# Patient Record
Sex: MALE | Race: WHITE | Employment: FULL TIME | URBAN - NONMETROPOLITAN AREA
[De-identification: names, ages, dates, MRNs, and addresses within clinical notes are randomized per-mention and may not be internally consistent; named-entity substitution may affect disease eponyms.]

---

## 2017-01-09 DIAGNOSIS — E11.8 CONTROLLED TYPE 2 DIABETES MELLITUS WITH COMPLICATION, WITHOUT LONG-TERM CURRENT USE OF INSULIN (HCC): ICD-10-CM

## 2017-01-09 LAB
ALBUMIN SERPL-MCNC: 4.4 G/DL (ref 3.5–5.2)
ALP BLD-CCNC: 52 U/L (ref 40–130)
ALT SERPL-CCNC: 66 U/L (ref 5–41)
ANION GAP SERPL CALCULATED.3IONS-SCNC: 13 MMOL/L (ref 7–19)
AST SERPL-CCNC: 32 U/L (ref 5–40)
BILIRUB SERPL-MCNC: 0.9 MG/DL (ref 0.2–1.2)
BUN BLDV-MCNC: 12 MG/DL (ref 6–20)
CALCIUM SERPL-MCNC: 9.3 MG/DL (ref 8.6–10)
CHLORIDE BLD-SCNC: 101 MMOL/L (ref 98–111)
CHOLESTEROL, TOTAL: 136 MG/DL (ref 160–199)
CO2: 27 MMOL/L (ref 22–29)
CREAT SERPL-MCNC: 0.6 MG/DL (ref 0.5–1.2)
CREATININE URINE: 321.8 MG/DL (ref 4.2–622)
GFR NON-AFRICAN AMERICAN: >60
GLOBULIN: 2.9 G/DL
GLUCOSE BLD-MCNC: 156 MG/DL (ref 74–109)
HBA1C MFR BLD: 6.6 %
HDLC SERPL-MCNC: 33 MG/DL (ref 55–121)
LDL CHOLESTEROL CALCULATED: 86 MG/DL
MICROALBUMIN UR-MCNC: 3.8 MG/DL (ref 0–19)
MICROALBUMIN/CREAT UR-RTO: 11.8 MG/G
POTASSIUM SERPL-SCNC: 4.7 MMOL/L (ref 3.5–5)
SODIUM BLD-SCNC: 141 MMOL/L (ref 136–145)
TOTAL PROTEIN: 7.3 G/DL (ref 6.6–8.7)
TRIGL SERPL-MCNC: 85 MG/DL (ref 150–199)

## 2017-01-19 ENCOUNTER — TELEPHONE (OUTPATIENT)
Dept: PRIMARY CARE CLINIC | Age: 42
End: 2017-01-19

## 2017-09-23 ENCOUNTER — APPOINTMENT (OUTPATIENT)
Dept: GENERAL RADIOLOGY | Facility: HOSPITAL | Age: 42
End: 2017-09-23

## 2017-09-23 ENCOUNTER — HOSPITAL ENCOUNTER (EMERGENCY)
Facility: HOSPITAL | Age: 42
Discharge: HOME | End: 2017-09-23
Attending: EMERGENCY MEDICINE | Admitting: EMERGENCY MEDICINE

## 2017-09-23 VITALS
WEIGHT: 280 LBS | HEIGHT: 70 IN | BODY MASS INDEX: 40.09 KG/M2 | DIASTOLIC BLOOD PRESSURE: 89 MMHG | SYSTOLIC BLOOD PRESSURE: 139 MMHG | OXYGEN SATURATION: 99 % | HEART RATE: 75 BPM | TEMPERATURE: 97.8 F | RESPIRATION RATE: 16 BRPM

## 2017-09-23 DIAGNOSIS — R10.9 ABDOMINAL PAIN, UNSPECIFIED LOCATION: Primary | ICD-10-CM

## 2017-09-23 LAB
ALBUMIN SERPL-MCNC: 4.7 G/DL (ref 3.5–5)
ALBUMIN/GLOB SERPL: 1.6 G/DL (ref 1.1–2.5)
ALP SERPL-CCNC: 51 U/L (ref 24–120)
ALT SERPL W P-5'-P-CCNC: 96 U/L (ref 0–54)
AMYLASE SERPL-CCNC: 54 U/L (ref 30–110)
ANION GAP SERPL CALCULATED.3IONS-SCNC: 17 MMOL/L (ref 4–13)
AST SERPL-CCNC: 46 U/L (ref 7–45)
BASOPHILS # BLD AUTO: 0.04 10*3/MM3 (ref 0–0.2)
BASOPHILS NFR BLD AUTO: 0.5 % (ref 0–2)
BILIRUB SERPL-MCNC: 1.2 MG/DL (ref 0.1–1)
BILIRUB UR QL STRIP: NEGATIVE
BUN BLD-MCNC: 12 MG/DL (ref 5–21)
BUN/CREAT SERPL: 18.2 (ref 7–25)
CALCIUM SPEC-SCNC: 9.8 MG/DL (ref 8.4–10.4)
CHLORIDE SERPL-SCNC: 104 MMOL/L (ref 98–110)
CLARITY UR: CLEAR
CO2 SERPL-SCNC: 23 MMOL/L (ref 24–31)
COLOR UR: YELLOW
CREAT BLD-MCNC: 0.66 MG/DL (ref 0.5–1.4)
DEPRECATED RDW RBC AUTO: 41.9 FL (ref 40–54)
EOSINOPHIL # BLD AUTO: 0.19 10*3/MM3 (ref 0–0.7)
EOSINOPHIL NFR BLD AUTO: 2.4 % (ref 0–4)
ERYTHROCYTE [DISTWIDTH] IN BLOOD BY AUTOMATED COUNT: 12.9 % (ref 12–15)
GFR SERPL CREATININE-BSD FRML MDRD: 132 ML/MIN/1.73
GFR SERPL CREATININE-BSD FRML MDRD: >150 ML/MIN/1.73
GLOBULIN UR ELPH-MCNC: 2.9 GM/DL
GLUCOSE BLD-MCNC: 172 MG/DL (ref 70–100)
GLUCOSE UR STRIP-MCNC: NEGATIVE MG/DL
HCT VFR BLD AUTO: 42.4 % (ref 40–52)
HGB BLD-MCNC: 14.4 G/DL (ref 14–18)
HGB UR QL STRIP.AUTO: NEGATIVE
HOLD SPECIMEN: NORMAL
HOLD SPECIMEN: NORMAL
IMM GRANULOCYTES # BLD: 0.01 10*3/MM3 (ref 0–0.03)
IMM GRANULOCYTES NFR BLD: 0.1 % (ref 0–5)
KETONES UR QL STRIP: NEGATIVE
LEUKOCYTE ESTERASE UR QL STRIP.AUTO: NEGATIVE
LIPASE SERPL-CCNC: 112 U/L (ref 23–203)
LYMPHOCYTES # BLD AUTO: 1.66 10*3/MM3 (ref 0.72–4.86)
LYMPHOCYTES NFR BLD AUTO: 20.6 % (ref 15–45)
MCH RBC QN AUTO: 30.7 PG (ref 28–32)
MCHC RBC AUTO-ENTMCNC: 34 G/DL (ref 33–36)
MCV RBC AUTO: 90.4 FL (ref 82–95)
MONOCYTES # BLD AUTO: 0.74 10*3/MM3 (ref 0.19–1.3)
MONOCYTES NFR BLD AUTO: 9.2 % (ref 4–12)
NEUTROPHILS # BLD AUTO: 5.42 10*3/MM3 (ref 1.87–8.4)
NEUTROPHILS NFR BLD AUTO: 67.2 % (ref 39–78)
NITRITE UR QL STRIP: NEGATIVE
PH UR STRIP.AUTO: 6 [PH] (ref 5–8)
PLATELET # BLD AUTO: 207 10*3/MM3 (ref 130–400)
PMV BLD AUTO: 12.6 FL (ref 6–12)
POTASSIUM BLD-SCNC: 4.1 MMOL/L (ref 3.5–5.3)
PROT SERPL-MCNC: 7.6 G/DL (ref 6.3–8.7)
PROT UR QL STRIP: NEGATIVE
RBC # BLD AUTO: 4.69 10*6/MM3 (ref 4.8–5.9)
SODIUM BLD-SCNC: 144 MMOL/L (ref 135–145)
SP GR UR STRIP: 1.03 (ref 1–1.03)
UROBILINOGEN UR QL STRIP: NORMAL
WBC NRBC COR # BLD: 8.06 10*3/MM3 (ref 4.8–10.8)
WHOLE BLOOD HOLD SPECIMEN: NORMAL
WHOLE BLOOD HOLD SPECIMEN: NORMAL

## 2017-09-23 PROCEDURE — 74000 HC ABDOMEN KUB: CPT

## 2017-09-23 PROCEDURE — 85025 COMPLETE CBC W/AUTO DIFF WBC: CPT | Performed by: EMERGENCY MEDICINE

## 2017-09-23 PROCEDURE — 99283 EMERGENCY DEPT VISIT LOW MDM: CPT

## 2017-09-23 PROCEDURE — 82150 ASSAY OF AMYLASE: CPT | Performed by: EMERGENCY MEDICINE

## 2017-09-23 PROCEDURE — 81003 URINALYSIS AUTO W/O SCOPE: CPT | Performed by: EMERGENCY MEDICINE

## 2017-09-23 PROCEDURE — 83690 ASSAY OF LIPASE: CPT | Performed by: EMERGENCY MEDICINE

## 2017-09-23 PROCEDURE — 80053 COMPREHEN METABOLIC PANEL: CPT | Performed by: EMERGENCY MEDICINE

## 2017-09-23 RX ORDER — LISINOPRIL 40 MG/1
20 TABLET ORAL DAILY
COMMUNITY

## 2017-09-23 RX ORDER — CARVEDILOL 6.25 MG/1
6.25 TABLET ORAL 2 TIMES DAILY WITH MEALS
COMMUNITY
End: 2020-09-20

## 2017-09-23 RX ORDER — ARIPIPRAZOLE 15 MG/1
15 TABLET ORAL DAILY
COMMUNITY

## 2017-09-23 RX ORDER — ATORVASTATIN CALCIUM 10 MG/1
10 TABLET, FILM COATED ORAL DAILY
COMMUNITY

## 2017-09-23 RX ORDER — SODIUM CHLORIDE 0.9 % (FLUSH) 0.9 %
10 SYRINGE (ML) INJECTION AS NEEDED
Status: DISCONTINUED | OUTPATIENT
Start: 2017-09-23 | End: 2017-09-23 | Stop reason: HOSPADM

## 2017-09-23 NOTE — ED PROVIDER NOTES
"Subjective   HPI Comments: 42-year-old gentleman presents to our facility via private vehicle alone with a complaint of abdominal pain.  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    Intensity of pain is 10 out of 10 sharp in nature short lived no specific precipitating factor he feels associated with movement.  Relieved with nothing specific.  He has had similar episodes in the past he has had a abdominal surgery gastric banding in that area and the pain just above the surgical scar.  There is no nausea no vomiting no chest pain no shortness of breath no fever no burning in the urine no back pain.    Patient is a 42 y.o. male presenting with abdominal pain.   History provided by:  Patient   used: No    Abdominal Pain   Pain location:  LUQ  Pain quality: sharp    Pain radiates to:  Does not radiate  Pain severity:  Severe  Onset quality:  Sudden  Timing:  Intermittent  Chronicity:  Recurrent  Context: not alcohol use, not diet changes, not eating and not recent illness    Relieved by:  Nothing  Worsened by:  Nothing  Ineffective treatments:  Movement, not moving and position changes      Review of Systems   Gastrointestinal: Positive for abdominal pain.   All other systems reviewed and are negative.      Past Medical History:   Diagnosis Date   • Bipolar 1 disorder    • Diabetes mellitus    • Hypertension        Allergies   Allergen Reactions   • Percocet [Oxycodone-Acetaminophen] Other (See Comments)     Feels like \"bugs are crawling on me\"       Past Surgical History:   Procedure Laterality Date   • " COLONOSCOPY     • LAPAROSCOPIC GASTRIC BANDING         History reviewed. No pertinent family history.    Social History     Social History   • Marital status:      Spouse name: N/A   • Number of children: N/A   • Years of education: N/A     Social History Main Topics   • Smoking status: Current Every Day Smoker     Types: Electronic Cigarette   • Smokeless tobacco: None      Comment: former smoker, quit 10 yrs ago   • Alcohol use No   • Drug use: No   • Sexual activity: Not Asked     Other Topics Concern   • None     Social History Narrative   • None           Objective   Physical Exam   Constitutional: He is oriented to person, place, and time. He appears well-developed and well-nourished.   HENT:   Head: Normocephalic.   Eyes: EOM are normal. Pupils are equal, round, and reactive to light.   Neck: Normal range of motion. Neck supple.   Cardiovascular: Normal rate and regular rhythm.    Pulmonary/Chest: Effort normal and breath sounds normal.   Abdominal:   Obese abdomen surgical scar noted over the left upper quadrant which is healed appropriately.  Over this area there is moderate amount tenderness on deep palpation.  There is no hepatosplenomegaly.  There is no abnormal palpable mass.  There is no abnormal palpable pulse.   Neurological: He is alert and oriented to person, place, and time. He has normal reflexes.   Skin: Skin is warm.   Psychiatric: His behavior is normal. Judgment and thought content normal.   Nursing note and vitals reviewed.      Procedures         ED Course  ED Course   Comment By Time   Repeat examination 9:45 AM he is comfortable on his iPhone appears to be no acute distress his laboratory studies are within normal limits.  At this time I am going to be discharging him home. Dylan BUENO MD 09/23 [id number]                  [facility]  Number of Diagnoses or Management Options  Abdominal pain, unspecified location: new and requires workup     Amount and/or Complexity of Data  Reviewed  Clinical lab tests: reviewed and ordered  Tests in the radiology section of CPT®: ordered and reviewed    Risk of Complications, Morbidity, and/or Mortality  Presenting problems: high  Diagnostic procedures: high  Management options: high    Patient Progress  Patient progress: stable      Final diagnoses:   Abdominal pain, unspecified location            Dylan BUENO MD  09/24/17 [id number]

## 2017-10-16 ENCOUNTER — OFFICE VISIT (OUTPATIENT)
Dept: PRIMARY CARE CLINIC | Age: 42
End: 2017-10-16
Payer: MEDICARE

## 2017-10-16 VITALS
HEIGHT: 70 IN | DIASTOLIC BLOOD PRESSURE: 82 MMHG | WEIGHT: 278.4 LBS | HEART RATE: 77 BPM | BODY MASS INDEX: 39.86 KG/M2 | TEMPERATURE: 97.9 F | OXYGEN SATURATION: 93 % | SYSTOLIC BLOOD PRESSURE: 130 MMHG

## 2017-10-16 DIAGNOSIS — Z23 NEEDS FLU SHOT: ICD-10-CM

## 2017-10-16 DIAGNOSIS — E11.8 CONTROLLED TYPE 2 DIABETES MELLITUS WITH COMPLICATION, WITHOUT LONG-TERM CURRENT USE OF INSULIN (HCC): ICD-10-CM

## 2017-10-16 DIAGNOSIS — R10.12 LEFT UPPER QUADRANT PAIN: ICD-10-CM

## 2017-10-16 DIAGNOSIS — E11.8 CONTROLLED TYPE 2 DIABETES MELLITUS WITH COMPLICATION, WITHOUT LONG-TERM CURRENT USE OF INSULIN (HCC): Primary | ICD-10-CM

## 2017-10-16 LAB
ALBUMIN SERPL-MCNC: 4.4 G/DL (ref 3.5–5.2)
ALP BLD-CCNC: 58 U/L (ref 40–130)
ALT SERPL-CCNC: 74 U/L (ref 5–41)
ANION GAP SERPL CALCULATED.3IONS-SCNC: 17 MMOL/L (ref 7–19)
AST SERPL-CCNC: 36 U/L (ref 5–40)
BILIRUB SERPL-MCNC: 1.3 MG/DL (ref 0.2–1.2)
BUN BLDV-MCNC: 9 MG/DL (ref 6–20)
CALCIUM SERPL-MCNC: 9.5 MG/DL (ref 8.6–10)
CHLORIDE BLD-SCNC: 96 MMOL/L (ref 98–111)
CHOLESTEROL, TOTAL: 124 MG/DL (ref 160–199)
CO2: 25 MMOL/L (ref 22–29)
CREAT SERPL-MCNC: 0.6 MG/DL (ref 0.5–1.2)
CREATININE URINE: 274.7 MG/DL (ref 4.2–622)
GFR NON-AFRICAN AMERICAN: >60
GLUCOSE BLD-MCNC: 178 MG/DL (ref 74–109)
HBA1C MFR BLD: 7.7 %
HDLC SERPL-MCNC: 33 MG/DL (ref 55–121)
LDL CHOLESTEROL CALCULATED: 65 MG/DL
MICROALBUMIN UR-MCNC: 3.3 MG/DL (ref 0–19)
MICROALBUMIN/CREAT UR-RTO: 12 MG/G
POTASSIUM SERPL-SCNC: 4.3 MMOL/L (ref 3.5–5)
SODIUM BLD-SCNC: 138 MMOL/L (ref 136–145)
TOTAL PROTEIN: 7.3 G/DL (ref 6.6–8.7)
TRIGL SERPL-MCNC: 129 MG/DL (ref 0–149)

## 2017-10-16 PROCEDURE — G0008 ADMIN INFLUENZA VIRUS VAC: HCPCS | Performed by: FAMILY MEDICINE

## 2017-10-16 PROCEDURE — 1036F TOBACCO NON-USER: CPT | Performed by: FAMILY MEDICINE

## 2017-10-16 PROCEDURE — 99213 OFFICE O/P EST LOW 20 MIN: CPT | Performed by: FAMILY MEDICINE

## 2017-10-16 PROCEDURE — G8417 CALC BMI ABV UP PARAM F/U: HCPCS | Performed by: FAMILY MEDICINE

## 2017-10-16 PROCEDURE — G8484 FLU IMMUNIZE NO ADMIN: HCPCS | Performed by: FAMILY MEDICINE

## 2017-10-16 PROCEDURE — 90688 IIV4 VACCINE SPLT 0.5 ML IM: CPT | Performed by: FAMILY MEDICINE

## 2017-10-16 PROCEDURE — 3046F HEMOGLOBIN A1C LEVEL >9.0%: CPT | Performed by: FAMILY MEDICINE

## 2017-10-16 PROCEDURE — G8427 DOCREV CUR MEDS BY ELIG CLIN: HCPCS | Performed by: FAMILY MEDICINE

## 2017-10-16 ASSESSMENT — ENCOUNTER SYMPTOMS
ABDOMINAL DISTENTION: 0
ABDOMINAL PAIN: 1
CONSTIPATION: 0
COUGH: 0
DIARRHEA: 0
SHORTNESS OF BREATH: 0

## 2017-10-16 NOTE — PROGRESS NOTES
Farida Campbell is a 36 y.o. male    HPI  Farida Campbell presents to the office for evaluation of severe abdominal pain. Patient describes as sharp stabbing pain is intermittent. Patient recently had one episode 3 weeks ago that required patient to go to the emergency room. Patient stated occurred suddenly and took his breath away. Patient states his left upper quadrant felt very tight. Patient denied any nausea or vomiting. Patient denied any constipation. Patient has had lap band surgery in the past. Patient developed complications from  Lap band surgery and had it removed in 2012. Patient describes the pain as a dull ache currently. Review of Systems   Constitutional: Negative for chills and fever. Respiratory: Negative for cough and shortness of breath. Cardiovascular: Negative for chest pain, palpitations and leg swelling. Gastrointestinal: Positive for abdominal pain. Negative for abdominal distention, constipation and diarrhea. Prior to Admission medications    Medication Sig Start Date End Date Taking?  Authorizing Provider   famotidine (PEPCID) 20 MG tablet Take 20 mg by mouth daily   Yes Historical Provider, MD   sitaGLIPtin (JANUVIA) 50 MG tablet Take 1 tablet by mouth daily 12/19/16  Yes Gonzalo Montana MD   atorvastatin (LIPITOR) 10 MG tablet Take 1 tablet by mouth daily 12/19/16  Yes Gonzalo Montana MD   carvedilol (COREG) 6.25 MG tablet Take 1 tablet by mouth 2 times daily (with meals) 12/19/16  Yes Gonzalo Montana MD   lisinopril (PRINIVIL;ZESTRIL) 40 MG tablet TAKE 1 TABLET BY MOUTH EVERY DAY 12/19/16  Yes Gonzalo Montana MD   ARIPiprazole (ABILIFY) 15 MG tablet Take 1 tablet by mouth daily 12/19/16  Yes Gonzalo Montana MD   metFORMIN (GLUCOPHAGE) 1000 MG tablet Take 1 tablet by mouth 2 times daily (with meals) 12/19/16  Yes Gonzalo Montana MD   Potassium Gluconate 550 MG TABS Take 550 mg by mouth nightly    Yes Historical Provider, MD Past Medical History:   Diagnosis Date    Anxiety     Bipolar disorder ([address]     HTN (hypertension)     Hyperlipidemia        Past Surgical History:   Procedure Laterality Date    LAP BAND  placed 2009 with removal in 2011    PILONIDAL CYST EXCISION      WISDOM TOOTH EXTRACTION         Social History     Social History    Marital status:      Spouse name: N/A    Number of children: N/A    Years of education: N/A     Social History Main Topics    Smoking status: Never Smoker    Smokeless tobacco: Never Used      Comment: patient does use a vape    Alcohol use No    Drug use: No    Sexual activity: Not Asked     Other Topics Concern    None     Social History Narrative    None       Physical Exam   Constitutional: He is oriented to person, place, and time. He appears well-developed and well-nourished. HENT:   Head: Normocephalic and atraumatic. Eyes: Conjunctivae are normal. No scleral icterus. Neck: No JVD present. Carotid bruit is not present. Cardiovascular: Normal rate, regular rhythm and normal heart sounds. Exam reveals no friction rub. No murmur heard. Pulmonary/Chest: Effort normal and breath sounds normal. No respiratory distress. He has no wheezes. Abdominal: Soft. Bowel sounds are normal. He exhibits no distension and no mass. There is tenderness in the left upper quadrant. There is no rebound and no guarding. Musculoskeletal: Normal range of motion. Lymphadenopathy:     He has no cervical adenopathy. Right cervical: No superficial cervical and no posterior cervical adenopathy present. Left cervical: No superficial cervical and no posterior cervical adenopathy present. Neurological: He is alert and oriented to person, place, and time. No cranial nerve deficit. Skin: Skin is warm. No rash noted. No erythema. Psychiatric: He has a normal mood and affect. Thought content normal.   Nursing note and vitals reviewed.       Assessment

## 2017-10-23 ENCOUNTER — TELEPHONE (OUTPATIENT)
Dept: PRIMARY CARE CLINIC | Age: 42
End: 2017-10-23

## 2017-10-23 ENCOUNTER — HOSPITAL ENCOUNTER (OUTPATIENT)
Dept: CT IMAGING | Age: 42
Discharge: HOME | End: 2017-10-23
Payer: MEDICARE

## 2017-10-23 DIAGNOSIS — R10.12 LEFT UPPER QUADRANT PAIN: ICD-10-CM

## 2017-10-23 DIAGNOSIS — R74.01 ELEVATED ALT MEASUREMENT: ICD-10-CM

## 2017-10-23 DIAGNOSIS — R73.09 ELEVATED HEMOGLOBIN A1C: Primary | ICD-10-CM

## 2017-10-23 PROCEDURE — 6360000004 HC RX CONTRAST MEDICATION: Performed by: FAMILY MEDICINE

## 2017-10-23 PROCEDURE — 74177 CT ABD & PELVIS W/CONTRAST: CPT

## 2017-10-23 RX ADMIN — IOVERSOL 75 ML: 741 INJECTION INTRA-ARTERIAL; INTRAVENOUS at 08:23

## 2017-10-23 NOTE — TELEPHONE ENCOUNTER
----- Message from Shivam Maldonado MD sent at 10/20/2017  7:35 AM CDT -----  Please inform patient of abnormal test results. Hemoglobin A1c is 7.7. Previously 6.6. Cholesterol is well controlled. CMP reveals isolated elevation in ALT. Will repeat labs CMP and hemoglobin A1c in 3 months.

## 2017-10-31 ENCOUNTER — TELEPHONE (OUTPATIENT)
Dept: PRIMARY CARE CLINIC | Age: 42
End: 2017-10-31

## 2017-10-31 NOTE — TELEPHONE ENCOUNTER
----- Message from CATHIE Holt sent at 10/30/2017  2:39 PM CDT -----  Please notify patient of normal results. No abnormalities of the spleen. Follow up as needed.

## 2017-12-11 DIAGNOSIS — I10 ESSENTIAL HYPERTENSION: ICD-10-CM

## 2017-12-11 DIAGNOSIS — E11.8 CONTROLLED TYPE 2 DIABETES MELLITUS WITH COMPLICATION, WITHOUT LONG-TERM CURRENT USE OF INSULIN (HCC): ICD-10-CM

## 2017-12-11 RX ORDER — ATORVASTATIN CALCIUM 10 MG/1
10 TABLET, FILM COATED ORAL DAILY
Qty: 90 TABLET | Refills: 0 | Status: SHIPPED | OUTPATIENT
Start: 2017-12-11 | End: 2018-03-26 | Stop reason: SDUPTHER

## 2017-12-13 DIAGNOSIS — I10 ESSENTIAL HYPERTENSION: ICD-10-CM

## 2017-12-13 RX ORDER — CARVEDILOL 6.25 MG/1
6.25 TABLET ORAL 2 TIMES DAILY WITH MEALS
Qty: 180 TABLET | Refills: 3 | Status: SHIPPED | OUTPATIENT
Start: 2017-12-13 | End: 2018-04-17 | Stop reason: SDUPTHER

## 2017-12-24 DIAGNOSIS — E11.8 CONTROLLED TYPE 2 DIABETES MELLITUS WITH COMPLICATION, WITHOUT LONG-TERM CURRENT USE OF INSULIN (HCC): ICD-10-CM

## 2017-12-25 RX ORDER — SITAGLIPTIN 50 MG/1
50 TABLET, FILM COATED ORAL DAILY
Qty: 90 TABLET | Refills: 0 | Status: SHIPPED | OUTPATIENT
Start: 2017-12-25 | End: 2018-03-20 | Stop reason: SDUPTHER

## 2018-01-06 RX ORDER — ARIPIPRAZOLE 15 MG/1
15 TABLET ORAL DAILY
Qty: 90 TABLET | Refills: 0 | Status: SHIPPED | OUTPATIENT
Start: 2018-01-06 | End: 2018-04-01 | Stop reason: SDUPTHER

## 2018-01-15 DIAGNOSIS — I10 ESSENTIAL HYPERTENSION: ICD-10-CM

## 2018-01-16 RX ORDER — LISINOPRIL 40 MG/1
TABLET ORAL
Qty: 90 TABLET | Refills: 0 | Status: SHIPPED | OUTPATIENT
Start: 2018-01-16 | End: 2018-04-11 | Stop reason: SDUPTHER

## 2018-03-26 DIAGNOSIS — I10 ESSENTIAL HYPERTENSION: ICD-10-CM

## 2018-03-26 RX ORDER — ATORVASTATIN CALCIUM 10 MG/1
10 TABLET, FILM COATED ORAL DAILY
Qty: 90 TABLET | Refills: 0 | Status: SHIPPED | OUTPATIENT
Start: 2018-03-26 | End: 2018-04-17 | Stop reason: SDUPTHER

## 2018-04-02 ENCOUNTER — OFFICE VISIT (OUTPATIENT)
Dept: PRIMARY CARE CLINIC | Age: 43
End: 2018-04-02
Payer: COMMERCIAL

## 2018-04-02 VITALS
TEMPERATURE: 99.2 F | WEIGHT: 277 LBS | DIASTOLIC BLOOD PRESSURE: 86 MMHG | BODY MASS INDEX: 39.75 KG/M2 | SYSTOLIC BLOOD PRESSURE: 134 MMHG | HEART RATE: 82 BPM | OXYGEN SATURATION: 96 %

## 2018-04-02 DIAGNOSIS — L91.8 SKIN TAG OF PERINEUM IN MALE: Primary | ICD-10-CM

## 2018-04-02 PROCEDURE — 99212 OFFICE O/P EST SF 10 MIN: CPT | Performed by: INTERNAL MEDICINE

## 2018-04-02 RX ORDER — ARIPIPRAZOLE 15 MG/1
15 TABLET ORAL DAILY
Qty: 90 TABLET | Refills: 0 | Status: SHIPPED | OUTPATIENT
Start: 2018-04-02 | End: 2018-04-17 | Stop reason: SDUPTHER

## 2018-04-02 ASSESSMENT — PATIENT HEALTH QUESTIONNAIRE - PHQ9
SUM OF ALL RESPONSES TO PHQ QUESTIONS 1-9: 0
SUM OF ALL RESPONSES TO PHQ9 QUESTIONS 1 & 2: 0
2. FEELING DOWN, DEPRESSED OR HOPELESS: 0
1. LITTLE INTEREST OR PLEASURE IN DOING THINGS: 0

## 2018-04-11 ENCOUNTER — OFFICE VISIT (OUTPATIENT)
Dept: PRIMARY CARE CLINIC | Age: 43
End: 2018-04-11
Payer: COMMERCIAL

## 2018-04-11 VITALS
DIASTOLIC BLOOD PRESSURE: 80 MMHG | HEART RATE: 82 BPM | WEIGHT: 274 LBS | OXYGEN SATURATION: 96 % | HEIGHT: 70 IN | TEMPERATURE: 98.8 F | BODY MASS INDEX: 39.22 KG/M2 | SYSTOLIC BLOOD PRESSURE: 122 MMHG

## 2018-04-11 DIAGNOSIS — I10 ESSENTIAL HYPERTENSION: ICD-10-CM

## 2018-04-11 DIAGNOSIS — E11.9 TYPE 2 DIABETES MELLITUS WITHOUT COMPLICATION, WITHOUT LONG-TERM CURRENT USE OF INSULIN (HCC): Primary | ICD-10-CM

## 2018-04-11 DIAGNOSIS — E78.2 MIXED HYPERLIPIDEMIA: ICD-10-CM

## 2018-04-11 PROCEDURE — 99214 OFFICE O/P EST MOD 30 MIN: CPT | Performed by: FAMILY MEDICINE

## 2018-04-11 RX ORDER — LISINOPRIL 40 MG/1
TABLET ORAL
Qty: 90 TABLET | Refills: 3 | Status: SHIPPED | OUTPATIENT
Start: 2018-04-11 | End: 2018-04-17 | Stop reason: SDUPTHER

## 2018-04-11 ASSESSMENT — ENCOUNTER SYMPTOMS
COUGH: 0
SHORTNESS OF BREATH: 0
COLOR CHANGE: 0

## 2018-04-16 DIAGNOSIS — E11.8 CONTROLLED TYPE 2 DIABETES MELLITUS WITH COMPLICATION, WITHOUT LONG-TERM CURRENT USE OF INSULIN (HCC): ICD-10-CM

## 2018-04-16 DIAGNOSIS — E11.9 TYPE 2 DIABETES MELLITUS WITHOUT COMPLICATION, WITHOUT LONG-TERM CURRENT USE OF INSULIN (HCC): ICD-10-CM

## 2018-04-16 LAB
ALBUMIN SERPL-MCNC: 4.6 G/DL (ref 3.5–5.2)
ALP BLD-CCNC: 50 U/L (ref 40–130)
ALT SERPL-CCNC: 34 U/L (ref 5–41)
ANION GAP SERPL CALCULATED.3IONS-SCNC: 10 MMOL/L (ref 7–19)
AST SERPL-CCNC: 24 U/L (ref 5–40)
BILIRUB SERPL-MCNC: 1 MG/DL (ref 0.2–1.2)
BUN BLDV-MCNC: 12 MG/DL (ref 6–20)
CALCIUM SERPL-MCNC: 9.8 MG/DL (ref 8.6–10)
CHLORIDE BLD-SCNC: 102 MMOL/L (ref 98–111)
CHOLESTEROL, TOTAL: 150 MG/DL (ref 160–199)
CO2: 28 MMOL/L (ref 22–29)
CREAT SERPL-MCNC: 0.6 MG/DL (ref 0.5–1.2)
GFR NON-AFRICAN AMERICAN: >60
GLUCOSE BLD-MCNC: 127 MG/DL (ref 74–109)
HBA1C MFR BLD: 6.9 %
HDLC SERPL-MCNC: 36 MG/DL (ref 55–121)
LDL CHOLESTEROL CALCULATED: 83 MG/DL
POTASSIUM SERPL-SCNC: 4.8 MMOL/L (ref 3.5–5)
SODIUM BLD-SCNC: 140 MMOL/L (ref 136–145)
TOTAL PROTEIN: 7.2 G/DL (ref 6.6–8.7)
TRIGL SERPL-MCNC: 153 MG/DL (ref 0–149)

## 2018-04-16 RX ORDER — SITAGLIPTIN 50 MG/1
50 TABLET, FILM COATED ORAL DAILY
Qty: 30 TABLET | Refills: 0 | Status: SHIPPED | OUTPATIENT
Start: 2018-04-16 | End: 2019-04-05 | Stop reason: SDUPTHER

## 2018-04-17 DIAGNOSIS — I10 ESSENTIAL HYPERTENSION: ICD-10-CM

## 2018-04-17 RX ORDER — ATORVASTATIN CALCIUM 10 MG/1
10 TABLET, FILM COATED ORAL DAILY
Qty: 90 TABLET | Refills: 3 | Status: SHIPPED | OUTPATIENT
Start: 2018-04-17 | End: 2019-04-05 | Stop reason: SDUPTHER

## 2018-04-17 RX ORDER — LISINOPRIL 40 MG/1
TABLET ORAL
Qty: 90 TABLET | Refills: 3 | Status: SHIPPED | OUTPATIENT
Start: 2018-04-17 | End: 2019-04-05 | Stop reason: SDUPTHER

## 2018-04-17 RX ORDER — CARVEDILOL 6.25 MG/1
6.25 TABLET ORAL 2 TIMES DAILY WITH MEALS
Qty: 180 TABLET | Refills: 3 | Status: SHIPPED | OUTPATIENT
Start: 2018-04-17 | End: 2019-04-05 | Stop reason: SDUPTHER

## 2018-04-17 RX ORDER — ARIPIPRAZOLE 15 MG/1
15 TABLET ORAL DAILY
Qty: 90 TABLET | Refills: 3 | Status: SHIPPED | OUTPATIENT
Start: 2018-04-17 | End: 2019-04-05 | Stop reason: SDUPTHER

## 2018-05-01 ENCOUNTER — OFFICE VISIT (OUTPATIENT)
Dept: PRIMARY CARE CLINIC | Age: 43
End: 2018-05-01
Payer: MEDICARE

## 2018-05-01 VITALS
WEIGHT: 268.6 LBS | SYSTOLIC BLOOD PRESSURE: 126 MMHG | HEIGHT: 70 IN | TEMPERATURE: 98.3 F | BODY MASS INDEX: 38.45 KG/M2 | HEART RATE: 101 BPM | DIASTOLIC BLOOD PRESSURE: 80 MMHG | OXYGEN SATURATION: 98 %

## 2018-05-01 DIAGNOSIS — J30.1 ACUTE SEASONAL ALLERGIC RHINITIS DUE TO POLLEN: Primary | ICD-10-CM

## 2018-05-01 PROCEDURE — G8417 CALC BMI ABV UP PARAM F/U: HCPCS | Performed by: FAMILY MEDICINE

## 2018-05-01 PROCEDURE — 1036F TOBACCO NON-USER: CPT | Performed by: FAMILY MEDICINE

## 2018-05-01 PROCEDURE — 96372 THER/PROPH/DIAG INJ SC/IM: CPT | Performed by: FAMILY MEDICINE

## 2018-05-01 PROCEDURE — G8427 DOCREV CUR MEDS BY ELIG CLIN: HCPCS | Performed by: FAMILY MEDICINE

## 2018-05-01 PROCEDURE — 99213 OFFICE O/P EST LOW 20 MIN: CPT | Performed by: FAMILY MEDICINE

## 2018-05-01 RX ORDER — PENICILLIN V POTASSIUM 500 MG/1
TABLET ORAL
Refills: 0 | COMMUNITY
Start: 2018-04-02 | End: 2018-08-29

## 2018-05-01 RX ORDER — METHYLPREDNISOLONE ACETATE 40 MG/ML
40 INJECTION, SUSPENSION INTRA-ARTICULAR; INTRALESIONAL; INTRAMUSCULAR; SOFT TISSUE ONCE
Status: COMPLETED | OUTPATIENT
Start: 2018-05-01 | End: 2018-05-01

## 2018-05-01 RX ADMIN — METHYLPREDNISOLONE ACETATE 40 MG: 40 INJECTION, SUSPENSION INTRA-ARTICULAR; INTRALESIONAL; INTRAMUSCULAR; SOFT TISSUE at 15:00

## 2018-05-07 ENCOUNTER — HOSPITAL ENCOUNTER (OUTPATIENT)
Dept: GENERAL RADIOLOGY | Age: 43
Discharge: HOME | End: 2018-05-07
Payer: MEDICARE

## 2018-05-07 ENCOUNTER — PATIENT MESSAGE (OUTPATIENT)
Dept: PRIMARY CARE CLINIC | Age: 43
End: 2018-05-07

## 2018-05-07 ENCOUNTER — OFFICE VISIT (OUTPATIENT)
Dept: PRIMARY CARE CLINIC | Age: 43
End: 2018-05-07
Payer: MEDICARE

## 2018-05-07 VITALS
TEMPERATURE: 97.7 F | SYSTOLIC BLOOD PRESSURE: 126 MMHG | OXYGEN SATURATION: 96 % | DIASTOLIC BLOOD PRESSURE: 74 MMHG | BODY MASS INDEX: 37.65 KG/M2 | HEART RATE: 96 BPM | WEIGHT: 263 LBS | HEIGHT: 70 IN

## 2018-05-07 DIAGNOSIS — R50.9 FEVER, UNSPECIFIED FEVER CAUSE: ICD-10-CM

## 2018-05-07 DIAGNOSIS — J18.9 COMMUNITY ACQUIRED PNEUMONIA, UNSPECIFIED LATERALITY: Primary | ICD-10-CM

## 2018-05-07 DIAGNOSIS — J18.9 COMMUNITY ACQUIRED PNEUMONIA, UNSPECIFIED LATERALITY: ICD-10-CM

## 2018-05-07 DIAGNOSIS — R51.9 ACUTE NONINTRACTABLE HEADACHE, UNSPECIFIED HEADACHE TYPE: ICD-10-CM

## 2018-05-07 DIAGNOSIS — R05.9 COUGH IN ADULT: ICD-10-CM

## 2018-05-07 PROCEDURE — G8417 CALC BMI ABV UP PARAM F/U: HCPCS | Performed by: NURSE PRACTITIONER

## 2018-05-07 PROCEDURE — 99214 OFFICE O/P EST MOD 30 MIN: CPT | Performed by: NURSE PRACTITIONER

## 2018-05-07 PROCEDURE — 71046 X-RAY EXAM CHEST 2 VIEWS: CPT

## 2018-05-07 PROCEDURE — 1036F TOBACCO NON-USER: CPT | Performed by: NURSE PRACTITIONER

## 2018-05-07 PROCEDURE — G8428 CUR MEDS NOT DOCUMENT: HCPCS | Performed by: NURSE PRACTITIONER

## 2018-05-07 RX ORDER — CEFTRIAXONE 1 G/1
1 INJECTION, POWDER, FOR SOLUTION INTRAMUSCULAR; INTRAVENOUS ONCE
Status: DISCONTINUED | OUTPATIENT
Start: 2018-05-07 | End: 2019-06-27

## 2018-05-07 RX ORDER — DEXTROMETHORPHAN HYDROBROMIDE AND PROMETHAZINE HYDROCHLORIDE 15; 6.25 MG/5ML; MG/5ML
5 SYRUP ORAL 4 TIMES DAILY PRN
Qty: 1 BOTTLE | Refills: 0 | Status: SHIPPED | OUTPATIENT
Start: 2018-05-07 | End: 2018-05-08 | Stop reason: SDUPTHER

## 2018-05-07 RX ORDER — IPRATROPIUM BROMIDE AND ALBUTEROL SULFATE 2.5; .5 MG/3ML; MG/3ML
1 SOLUTION RESPIRATORY (INHALATION) ONCE
Status: DISCONTINUED | OUTPATIENT
Start: 2018-05-07 | End: 2019-06-27

## 2018-05-07 RX ORDER — IBUPROFEN 200 MG
600 TABLET ORAL EVERY 12 HOURS
COMMUNITY
End: 2021-07-02

## 2018-05-07 RX ORDER — DOXYCYCLINE 100 MG/1
100 CAPSULE ORAL 2 TIMES DAILY
Qty: 20 CAPSULE | Refills: 0 | Status: SHIPPED | OUTPATIENT
Start: 2018-05-07 | End: 2018-05-08 | Stop reason: SDUPTHER

## 2018-05-07 RX ORDER — ACETAMINOPHEN 500 MG
1000 TABLET ORAL EVERY 12 HOURS
COMMUNITY
End: 2018-08-29

## 2018-05-07 RX ORDER — METHYLPREDNISOLONE ACETATE 40 MG/ML
40 INJECTION, SUSPENSION INTRA-ARTICULAR; INTRALESIONAL; INTRAMUSCULAR; SOFT TISSUE ONCE
Status: DISCONTINUED | OUTPATIENT
Start: 2018-05-07 | End: 2018-10-02

## 2018-05-08 DIAGNOSIS — R05.9 COUGH IN ADULT: ICD-10-CM

## 2018-05-08 DIAGNOSIS — J18.9 COMMUNITY ACQUIRED PNEUMONIA, UNSPECIFIED LATERALITY: ICD-10-CM

## 2018-05-08 DIAGNOSIS — R73.09 ELEVATED HEMOGLOBIN A1C: ICD-10-CM

## 2018-05-08 LAB
ALBUMIN SERPL-MCNC: 4.5 G/DL (ref 3.5–5.2)
ALP BLD-CCNC: 57 U/L (ref 40–130)
ALT SERPL-CCNC: 54 U/L (ref 5–41)
ANION GAP SERPL CALCULATED.3IONS-SCNC: 15 MMOL/L (ref 7–19)
AST SERPL-CCNC: 35 U/L (ref 5–40)
BILIRUB SERPL-MCNC: 0.6 MG/DL (ref 0.2–1.2)
BUN BLDV-MCNC: 15 MG/DL (ref 6–20)
CALCIUM SERPL-MCNC: 9.7 MG/DL (ref 8.6–10)
CHLORIDE BLD-SCNC: 98 MMOL/L (ref 98–111)
CO2: 24 MMOL/L (ref 22–29)
CREAT SERPL-MCNC: 0.6 MG/DL (ref 0.5–1.2)
GFR NON-AFRICAN AMERICAN: >60
GLUCOSE BLD-MCNC: 142 MG/DL (ref 74–109)
HCT VFR BLD CALC: 45.5 % (ref 42–52)
HEMOGLOBIN: 15.1 G/DL (ref 14–18)
MCH RBC QN AUTO: 30.3 PG (ref 27–31)
MCHC RBC AUTO-ENTMCNC: 33.2 G/DL (ref 33–37)
MCV RBC AUTO: 91.2 FL (ref 80–94)
PDW BLD-RTO: 12.2 % (ref 11.5–14.5)
PLATELET # BLD: 212 K/UL (ref 130–400)
PMV BLD AUTO: 12.1 FL (ref 9.4–12.4)
POTASSIUM SERPL-SCNC: 4.2 MMOL/L (ref 3.5–5)
RBC # BLD: 4.99 M/UL (ref 4.7–6.1)
SODIUM BLD-SCNC: 137 MMOL/L (ref 136–145)
TOTAL PROTEIN: 7.5 G/DL (ref 6.6–8.7)
WBC # BLD: 4.7 K/UL (ref 4.8–10.8)

## 2018-05-08 RX ORDER — DEXTROMETHORPHAN HYDROBROMIDE AND PROMETHAZINE HYDROCHLORIDE 15; 6.25 MG/5ML; MG/5ML
5 SYRUP ORAL 4 TIMES DAILY PRN
Qty: 1 BOTTLE | Refills: 0 | Status: SHIPPED | OUTPATIENT
Start: 2018-05-08 | End: 2018-05-15

## 2018-05-08 RX ORDER — DOXYCYCLINE 100 MG/1
100 CAPSULE ORAL 2 TIMES DAILY
Qty: 20 CAPSULE | Refills: 0 | Status: SHIPPED | OUTPATIENT
Start: 2018-05-08 | End: 2018-05-18

## 2018-05-08 ASSESSMENT — ENCOUNTER SYMPTOMS
DIARRHEA: 0
CONSTIPATION: 0
SHORTNESS OF BREATH: 1
ORTHOPNEA: 0
HEMOPTYSIS: 0
SINUS PAIN: 1
COUGH: 1
SPUTUM PRODUCTION: 1
WHEEZING: 1

## 2018-05-10 ENCOUNTER — TELEPHONE (OUTPATIENT)
Dept: PRIMARY CARE CLINIC | Age: 43
End: 2018-05-10

## 2018-05-14 ENCOUNTER — TELEPHONE (OUTPATIENT)
Dept: PRIMARY CARE CLINIC | Age: 43
End: 2018-05-14

## 2018-05-16 ENCOUNTER — TELEPHONE (OUTPATIENT)
Dept: PRIMARY CARE CLINIC | Age: 43
End: 2018-05-16

## 2018-05-20 ASSESSMENT — ENCOUNTER SYMPTOMS
SINUS PRESSURE: 0
SINUS PAIN: 0
ALLERGIC REACTION: 1
COUGH: 0
RHINORRHEA: 1

## 2018-08-29 ENCOUNTER — OFFICE VISIT (OUTPATIENT)
Dept: PRIMARY CARE CLINIC | Age: 43
End: 2018-08-29
Payer: MEDICARE

## 2018-08-29 VITALS
DIASTOLIC BLOOD PRESSURE: 76 MMHG | OXYGEN SATURATION: 98 % | HEIGHT: 70 IN | TEMPERATURE: 97.6 F | SYSTOLIC BLOOD PRESSURE: 122 MMHG | WEIGHT: 257.2 LBS | BODY MASS INDEX: 36.82 KG/M2 | HEART RATE: 68 BPM

## 2018-08-29 DIAGNOSIS — F39 MOOD DISORDER (HCC): ICD-10-CM

## 2018-08-29 DIAGNOSIS — M67.479 GANGLION CYST OF FOOT: Primary | ICD-10-CM

## 2018-08-29 PROCEDURE — 1036F TOBACCO NON-USER: CPT | Performed by: FAMILY MEDICINE

## 2018-08-29 PROCEDURE — G8427 DOCREV CUR MEDS BY ELIG CLIN: HCPCS | Performed by: FAMILY MEDICINE

## 2018-08-29 PROCEDURE — G8417 CALC BMI ABV UP PARAM F/U: HCPCS | Performed by: FAMILY MEDICINE

## 2018-08-29 PROCEDURE — 99213 OFFICE O/P EST LOW 20 MIN: CPT | Performed by: FAMILY MEDICINE

## 2018-08-29 NOTE — PROGRESS NOTES
Lynda Ellsworth is a 39 y.o. male    Chief Complaint   Patient presents with    Cyst     left foot     HPI  . Lynda Ellsworth presents to the office for evaluation Of left foot swelling. Patient states he noticed on the inside of his foot a cyst began to appear. Patient states he has not had any recent trauma. Patient states it is not painful. Review of Systems    Prior to Admission medications    Medication Sig Start Date End Date Taking?  Authorizing Provider   ibuprofen (ADVIL;MOTRIN) 200 MG tablet Take 600 mg by mouth every 12 hours   Yes Historical Provider, MD   atorvastatin (LIPITOR) 10 MG tablet Take 1 tablet by mouth daily 4/17/18  Yes Carla Woodruff MD   ARIPiprazole (ABILIFY) 15 MG tablet Take 1 tablet by mouth daily 4/17/18  Yes Carla Woodruff MD   carvedilol (COREG) 6.25 MG tablet Take 1 tablet by mouth 2 times daily (with meals) 4/17/18  Yes Carla Woodruff MD   lisinopril (PRINIVIL;ZESTRIL) 40 MG tablet TAKE 1 TABLET BY MOUTH EVERY DAY 4/17/18  Yes Carla Woodruff MD   JANUVIA 50 MG tablet TAKE 1 TABLET BY MOUTH DAILY 4/16/18  Yes Carla Woodruff MD   metFORMIN (GLUCOPHAGE) 1000 MG tablet TAKE 1 TABLET BY MOUTH TWICE DAILY WITH MEALS 4/11/18  Yes Carla Woodruff MD   famotidine (PEPCID) 20 MG tablet Take 20 mg by mouth daily   Yes Historical Provider, MD   Potassium Gluconate 550 MG TABS Take 550 mg by mouth nightly    Yes Historical Provider, MD       Past Medical History:   Diagnosis Date    Anxiety     Bipolar disorder ([address]     HTN (hypertension)     Hyperlipidemia        Past Surgical History:   Procedure Laterality Date    LAP BAND  placed 2009 with removal in 2011    PILONIDAL CYST EXCISION      WISDOM TOOTH EXTRACTION         Social History     Social History    Marital status:      Spouse name: N/A    Number of children: N/A    Years of education: N/A     Social History Main Topics    Smoking status: Never Smoker    Smokeless tobacco: Never Used      Comment: patient does use a vape    Alcohol use No    Drug use: No    Sexual activity: Not Asked     Other Topics Concern    None     Social History Narrative    None       Physical Exam   Constitutional: He is oriented to person, place, and time. He appears well-developed and well-nourished. HENT:   Head: Normocephalic and atraumatic. Right Ear: External ear normal.   Left Ear: External ear normal.   Eyes: Conjunctivae are normal. No scleral icterus. Neck: Normal range of motion. Neck supple. Carotid bruit is not present. No thyromegaly present. Cardiovascular: Normal rate, regular rhythm and normal heart sounds. Exam reveals no friction rub. No murmur heard. Pulmonary/Chest: Effort normal and breath sounds normal. No respiratory distress. He has no wheezes. Musculoskeletal: Normal range of motion. Feet:    Lymphadenopathy:     He has no cervical adenopathy. Right cervical: No superficial cervical and no posterior cervical adenopathy present. Left cervical: No superficial cervical and no posterior cervical adenopathy present. Neurological: He is alert and oriented to person, place, and time. No cranial nerve deficit. Skin: Skin is warm. No rash noted. No erythema. Psychiatric: He has a normal mood and affect. Thought content normal.   Nursing note and vitals reviewed. Assessment    ICD-10-CM ICD-9-CM    1. Ganglion cyst of foot M67.479 727.43 External Referral To Podiatry for evaluation and treatment. Plan    No orders of the defined types were placed in this encounter. Orders Placed This Encounter   Procedures    External Referral To Podiatry        Return if symptoms worsen or fail to improve. There are no Patient Instructions on file for this visit.

## 2018-10-02 ENCOUNTER — OFFICE VISIT (OUTPATIENT)
Dept: PRIMARY CARE CLINIC | Age: 43
End: 2018-10-02
Payer: MEDICARE

## 2018-10-02 VITALS
TEMPERATURE: 97.9 F | HEART RATE: 79 BPM | BODY MASS INDEX: 36.33 KG/M2 | HEIGHT: 70 IN | DIASTOLIC BLOOD PRESSURE: 74 MMHG | WEIGHT: 253.8 LBS | SYSTOLIC BLOOD PRESSURE: 128 MMHG | OXYGEN SATURATION: 98 %

## 2018-10-02 DIAGNOSIS — J01.90 ACUTE RHINOSINUSITIS: ICD-10-CM

## 2018-10-02 DIAGNOSIS — H66.90 ACUTE OTITIS MEDIA, UNSPECIFIED OTITIS MEDIA TYPE: Primary | ICD-10-CM

## 2018-10-02 PROCEDURE — 96372 THER/PROPH/DIAG INJ SC/IM: CPT | Performed by: NURSE PRACTITIONER

## 2018-10-02 PROCEDURE — 1036F TOBACCO NON-USER: CPT | Performed by: NURSE PRACTITIONER

## 2018-10-02 PROCEDURE — G8427 DOCREV CUR MEDS BY ELIG CLIN: HCPCS | Performed by: NURSE PRACTITIONER

## 2018-10-02 PROCEDURE — G8484 FLU IMMUNIZE NO ADMIN: HCPCS | Performed by: NURSE PRACTITIONER

## 2018-10-02 PROCEDURE — G8417 CALC BMI ABV UP PARAM F/U: HCPCS | Performed by: NURSE PRACTITIONER

## 2018-10-02 PROCEDURE — 99213 OFFICE O/P EST LOW 20 MIN: CPT | Performed by: NURSE PRACTITIONER

## 2018-10-02 RX ORDER — METHYLPREDNISOLONE ACETATE 40 MG/ML
40 INJECTION, SUSPENSION INTRA-ARTICULAR; INTRALESIONAL; INTRAMUSCULAR; SOFT TISSUE ONCE
Status: COMPLETED | OUTPATIENT
Start: 2018-10-02 | End: 2018-10-02

## 2018-10-02 RX ORDER — FLUTICASONE PROPIONATE 50 MCG
1 SPRAY, SUSPENSION (ML) NASAL DAILY
Qty: 1 BOTTLE | Refills: 0 | Status: SHIPPED | OUTPATIENT
Start: 2018-10-02 | End: 2018-10-02 | Stop reason: ALTCHOICE

## 2018-10-02 RX ORDER — AMOXICILLIN AND CLAVULANATE POTASSIUM 875; 125 MG/1; MG/1
1 TABLET, FILM COATED ORAL EVERY 12 HOURS
Qty: 20 TABLET | Refills: 0 | Status: SHIPPED | OUTPATIENT
Start: 2018-10-02 | End: 2018-10-02 | Stop reason: ALTCHOICE

## 2018-10-02 RX ORDER — FLUTICASONE PROPIONATE 50 MCG
1 SPRAY, SUSPENSION (ML) NASAL DAILY
Qty: 1 BOTTLE | Refills: 3 | Status: SHIPPED | OUTPATIENT
Start: 2018-10-02 | End: 2019-10-14 | Stop reason: SDUPTHER

## 2018-10-02 RX ORDER — AMOXICILLIN AND CLAVULANATE POTASSIUM 875; 125 MG/1; MG/1
1 TABLET, FILM COATED ORAL EVERY 12 HOURS
Qty: 20 TABLET | Refills: 0 | Status: SHIPPED | OUTPATIENT
Start: 2018-10-02 | End: 2018-10-12

## 2018-10-02 RX ADMIN — METHYLPREDNISOLONE ACETATE 40 MG: 40 INJECTION, SUSPENSION INTRA-ARTICULAR; INTRALESIONAL; INTRAMUSCULAR; SOFT TISSUE at 16:44

## 2018-10-02 ASSESSMENT — ENCOUNTER SYMPTOMS
WHEEZING: 0
SINUS PRESSURE: 0
SHORTNESS OF BREATH: 0
SINUS PAIN: 0
COUGH: 1
RHINORRHEA: 1
SORE THROAT: 1

## 2018-10-02 NOTE — PROGRESS NOTES
TWICE DAILY WITH MEALS 180 tablet 3    famotidine (PEPCID) 20 MG tablet Take 20 mg by mouth daily      Potassium Gluconate 550 MG TABS Take 550 mg by mouth nightly        Current Facility-Administered Medications   Medication Dose Route Frequency Provider Last Rate Last Dose    cefTRIAXone (ROCEPHIN) injection 1 g  1 g Intramuscular Once CATHIE Fermin        ipratropium-albuterol (DUONEB) nebulizer solution 1 ampule  1 ampule Inhalation Once CATHIE Fermin           Allergies   Allergen Reactions    Oxycodone-Acetaminophen Other (See Comments)     Feels like \"bugs are crawling on me\"    Percocet [Oxycodone-Acetaminophen] Other (See Comments)     Feels like \"bugs\" are crawling on his body. History reviewed. No pertinent family history. Review of Systems   Constitutional: Negative for chills and fever. HENT: Positive for congestion, ear pain, rhinorrhea and sore throat. Negative for ear discharge, sinus pain and sinus pressure. Respiratory: Positive for cough. Negative for shortness of breath and wheezing. Neurological: Negative for headaches. Objective:     Physical Exam   Constitutional: He is oriented to person, place, and time. He appears well-developed and well-nourished. HENT:   Head: Normocephalic and atraumatic. Right Ear: There is swelling and tenderness. Tympanic membrane is erythematous. Tympanic membrane mobility is abnormal.   Left Ear: External ear normal.   Nose: Mucosal edema (left) and rhinorrhea present. Right sinus exhibits no maxillary sinus tenderness and no frontal sinus tenderness. Left sinus exhibits no maxillary sinus tenderness and no frontal sinus tenderness. Mouth/Throat: Oropharynx is clear and moist. No oropharyngeal exudate. Eyes: Pupils are equal, round, and reactive to light. Neck: Normal range of motion. Cardiovascular: Normal rate, regular rhythm, normal heart sounds and intact distal pulses.     Pulmonary/Chest: Effort

## 2018-10-09 ENCOUNTER — HOSPITAL ENCOUNTER (EMERGENCY)
Age: 43
Discharge: HOME | End: 2018-10-09
Attending: EMERGENCY MEDICINE
Payer: MEDICARE

## 2018-10-09 ENCOUNTER — APPOINTMENT (OUTPATIENT)
Dept: GENERAL RADIOLOGY | Age: 43
End: 2018-10-09
Payer: MEDICARE

## 2018-10-09 ENCOUNTER — TELEPHONE (OUTPATIENT)
Dept: PRIMARY CARE CLINIC | Age: 43
End: 2018-10-09

## 2018-10-09 ENCOUNTER — APPOINTMENT (OUTPATIENT)
Dept: CT IMAGING | Age: 43
End: 2018-10-09
Payer: MEDICARE

## 2018-10-09 VITALS
WEIGHT: 253 LBS | TEMPERATURE: 99.2 F | RESPIRATION RATE: 20 BRPM | OXYGEN SATURATION: 92 % | HEART RATE: 104 BPM | SYSTOLIC BLOOD PRESSURE: 122 MMHG | HEIGHT: 70 IN | DIASTOLIC BLOOD PRESSURE: 84 MMHG | BODY MASS INDEX: 36.22 KG/M2

## 2018-10-09 DIAGNOSIS — J40 BRONCHITIS: ICD-10-CM

## 2018-10-09 DIAGNOSIS — R09.02 HYPOXIA: Primary | ICD-10-CM

## 2018-10-09 DIAGNOSIS — R06.89 DYSPNEA AND RESPIRATORY ABNORMALITIES: ICD-10-CM

## 2018-10-09 DIAGNOSIS — R06.00 DYSPNEA AND RESPIRATORY ABNORMALITIES: ICD-10-CM

## 2018-10-09 DIAGNOSIS — R06.2 WHEEZING: ICD-10-CM

## 2018-10-09 LAB
ALBUMIN SERPL-MCNC: 4.8 G/DL (ref 3.5–5.2)
ALP BLD-CCNC: 63 U/L (ref 40–130)
ALT SERPL-CCNC: 36 U/L (ref 5–41)
ANION GAP SERPL CALCULATED.3IONS-SCNC: 16 MMOL/L (ref 7–19)
AST SERPL-CCNC: 26 U/L (ref 5–40)
BASE EXCESS ARTERIAL: 1.7 MMOL/L (ref -2–2)
BASOPHILS ABSOLUTE: 0.1 K/UL (ref 0–0.2)
BASOPHILS RELATIVE PERCENT: 0.4 % (ref 0–1)
BILIRUB SERPL-MCNC: 1.2 MG/DL (ref 0.2–1.2)
BUN BLDV-MCNC: 16 MG/DL (ref 6–20)
CALCIUM SERPL-MCNC: 10.1 MG/DL (ref 8.6–10)
CARBOXYHEMOGLOBIN ARTERIAL: 2.1 % (ref 0–5)
CHLORIDE BLD-SCNC: 96 MMOL/L (ref 98–111)
CO2: 23 MMOL/L (ref 22–29)
CREAT SERPL-MCNC: 0.7 MG/DL (ref 0.5–1.2)
D DIMER: 0.56 UG/ML FEU (ref 0–0.48)
EOSINOPHILS ABSOLUTE: 0.3 K/UL (ref 0–0.6)
EOSINOPHILS RELATIVE PERCENT: 2 % (ref 0–5)
GFR NON-AFRICAN AMERICAN: >60
GLUCOSE BLD-MCNC: 188 MG/DL (ref 74–109)
HCO3 ARTERIAL: 24.6 MMOL/L (ref 22–26)
HCT VFR BLD CALC: 44.9 % (ref 42–52)
HEMOGLOBIN, ART, EXTENDED: 15.4 G/DL (ref 14–18)
HEMOGLOBIN: 15.2 G/DL (ref 14–18)
LACTIC ACID: 1.4 MMOL/L (ref 0.5–1.9)
LYMPHOCYTES ABSOLUTE: 0.5 K/UL (ref 1.1–4.5)
LYMPHOCYTES RELATIVE PERCENT: 3.8 % (ref 20–40)
MCH RBC QN AUTO: 30.5 PG (ref 27–31)
MCHC RBC AUTO-ENTMCNC: 33.9 G/DL (ref 33–37)
MCV RBC AUTO: 90 FL (ref 80–94)
METHEMOGLOBIN ARTERIAL: 1.1 %
MONOCYTES ABSOLUTE: 0.8 K/UL (ref 0–0.9)
MONOCYTES RELATIVE PERCENT: 6.7 % (ref 0–10)
NEUTROPHILS ABSOLUTE: 10.7 K/UL (ref 1.5–7.5)
NEUTROPHILS RELATIVE PERCENT: 86.8 % (ref 50–65)
O2 CONTENT ARTERIAL: 19.3 ML/DL
O2 SAT, ARTERIAL: 89.4 %
O2 THERAPY: ABNORMAL
PCO2 ARTERIAL: 33 MMHG (ref 35–45)
PDW BLD-RTO: 12.2 % (ref 11.5–14.5)
PH ARTERIAL: 7.48 (ref 7.35–7.45)
PLATELET # BLD: 196 K/UL (ref 130–400)
PMV BLD AUTO: 11.8 FL (ref 9.4–12.4)
PO2 ARTERIAL: 54 MMHG (ref 80–100)
POTASSIUM SERPL-SCNC: 4.2 MMOL/L (ref 3.5–5)
POTASSIUM, WHOLE BLOOD: 3.9
PRO-BNP: 102 PG/ML (ref 0–450)
RAPID INFLUENZA  B AGN: NEGATIVE
RAPID INFLUENZA A AGN: NEGATIVE
RBC # BLD: 4.99 M/UL (ref 4.7–6.1)
SODIUM BLD-SCNC: 135 MMOL/L (ref 136–145)
TOTAL PROTEIN: 7.7 G/DL (ref 6.6–8.7)
TROPONIN: <0.01 NG/ML (ref 0–0.03)
WBC # BLD: 12.3 K/UL (ref 4.8–10.8)

## 2018-10-09 PROCEDURE — 82803 BLOOD GASES ANY COMBINATION: CPT

## 2018-10-09 PROCEDURE — 85379 FIBRIN DEGRADATION QUANT: CPT

## 2018-10-09 PROCEDURE — 36415 COLL VENOUS BLD VENIPUNCTURE: CPT

## 2018-10-09 PROCEDURE — 83605 ASSAY OF LACTIC ACID: CPT

## 2018-10-09 PROCEDURE — 87804 INFLUENZA ASSAY W/OPTIC: CPT

## 2018-10-09 PROCEDURE — 36600 WITHDRAWAL OF ARTERIAL BLOOD: CPT

## 2018-10-09 PROCEDURE — 93005 ELECTROCARDIOGRAM TRACING: CPT

## 2018-10-09 PROCEDURE — 94640 AIRWAY INHALATION TREATMENT: CPT

## 2018-10-09 PROCEDURE — 2580000003 HC RX 258: Performed by: EMERGENCY MEDICINE

## 2018-10-09 PROCEDURE — 99285 EMERGENCY DEPT VISIT HI MDM: CPT

## 2018-10-09 PROCEDURE — 6360000002 HC RX W HCPCS: Performed by: EMERGENCY MEDICINE

## 2018-10-09 PROCEDURE — 96374 THER/PROPH/DIAG INJ IV PUSH: CPT

## 2018-10-09 PROCEDURE — 99285 EMERGENCY DEPT VISIT HI MDM: CPT | Performed by: EMERGENCY MEDICINE

## 2018-10-09 PROCEDURE — 85025 COMPLETE CBC W/AUTO DIFF WBC: CPT

## 2018-10-09 PROCEDURE — 71046 X-RAY EXAM CHEST 2 VIEWS: CPT

## 2018-10-09 PROCEDURE — 71275 CT ANGIOGRAPHY CHEST: CPT

## 2018-10-09 PROCEDURE — 6370000000 HC RX 637 (ALT 250 FOR IP): Performed by: EMERGENCY MEDICINE

## 2018-10-09 PROCEDURE — 83880 ASSAY OF NATRIURETIC PEPTIDE: CPT

## 2018-10-09 PROCEDURE — 80053 COMPREHEN METABOLIC PANEL: CPT

## 2018-10-09 PROCEDURE — 84484 ASSAY OF TROPONIN QUANT: CPT

## 2018-10-09 PROCEDURE — 94664 DEMO&/EVAL PT USE INHALER: CPT

## 2018-10-09 PROCEDURE — 84132 ASSAY OF SERUM POTASSIUM: CPT

## 2018-10-09 PROCEDURE — 6360000004 HC RX CONTRAST MEDICATION: Performed by: EMERGENCY MEDICINE

## 2018-10-09 RX ORDER — 0.9 % SODIUM CHLORIDE 0.9 %
1000 INTRAVENOUS SOLUTION INTRAVENOUS ONCE
Status: COMPLETED | OUTPATIENT
Start: 2018-10-09 | End: 2018-10-09

## 2018-10-09 RX ORDER — ALBUTEROL SULFATE 90 UG/1
2 AEROSOL, METERED RESPIRATORY (INHALATION) EVERY 6 HOURS PRN
Qty: 1 INHALER | Refills: 0 | Status: SHIPPED | OUTPATIENT
Start: 2018-10-09 | End: 2019-07-13 | Stop reason: ALTCHOICE

## 2018-10-09 RX ORDER — PREDNISONE 50 MG/1
50 TABLET ORAL DAILY
Qty: 5 TABLET | Refills: 0 | Status: SHIPPED | OUTPATIENT
Start: 2018-10-09 | End: 2018-10-14

## 2018-10-09 RX ORDER — IPRATROPIUM BROMIDE AND ALBUTEROL SULFATE 2.5; .5 MG/3ML; MG/3ML
1 SOLUTION RESPIRATORY (INHALATION) ONCE
Status: COMPLETED | OUTPATIENT
Start: 2018-10-09 | End: 2018-10-09

## 2018-10-09 RX ORDER — METHYLPREDNISOLONE SODIUM SUCCINATE 125 MG/2ML
125 INJECTION, POWDER, LYOPHILIZED, FOR SOLUTION INTRAMUSCULAR; INTRAVENOUS ONCE
Status: COMPLETED | OUTPATIENT
Start: 2018-10-09 | End: 2018-10-09

## 2018-10-09 RX ADMIN — SODIUM CHLORIDE 1000 ML: 9 INJECTION, SOLUTION INTRAVENOUS at 14:35

## 2018-10-09 RX ADMIN — IPRATROPIUM BROMIDE AND ALBUTEROL SULFATE 1 AMPULE: .5; 3 SOLUTION RESPIRATORY (INHALATION) at 13:40

## 2018-10-09 RX ADMIN — METHYLPREDNISOLONE SODIUM SUCCINATE 125 MG: 125 INJECTION, POWDER, FOR SOLUTION INTRAMUSCULAR; INTRAVENOUS at 14:35

## 2018-10-09 RX ADMIN — IOPAMIDOL 90 ML: 755 INJECTION, SOLUTION INTRAVENOUS at 14:56

## 2018-10-09 ASSESSMENT — ENCOUNTER SYMPTOMS
COUGH: 1
SORE THROAT: 0
WHEEZING: 1
SHORTNESS OF BREATH: 1
ABDOMINAL PAIN: 0

## 2018-10-09 NOTE — ED PROVIDER NOTES
[facility] [place] EMERGENCY DEPT  eMERGENCY dEPARTMENT eNCOUnter      Pt Name: Heena Cohn  MRN: [id number]  [place] 1975  Date of evaluation: 10/9/2018  Provider: Breanne Tripp MD    [address] [place] [address]       Chief Complaint   Patient presents with    Shortness of Breath     cough, chest congestion,  onset yesterday, is on antibiotic  amoxicillin 875mg po twice daily for sinus drainage, right ear infection, 4-5 days ago         HISTORY OF PRESENT ILLNESS   (Location/Symptom, Timing/Onset, Context/Setting, Quality, Duration, Modifying Factors, Severity)  Note limiting factors. Heena Cohn is a 39 y.o. male who presents to the emergency department      The history is provided by the patient. Shortness of Breath   Severity:  Moderate  Onset quality:  Sudden  Duration:  1 day  Timing:  Constant  Progression:  Worsening  Chronicity:  New  Context comment:  Day 4 Augmentin for \"sinus drainage and ear infection\"  Relieved by:  None tried  Worsened by: Activity and exertion  Ineffective treatments: Augmentin. Associated symptoms: cough and wheezing    Associated symptoms: no abdominal pain, no chest pain, no fever, no headaches and no sore throat    Risk factors: obesity    Risk factors comment:  DM, vapes      Nursing Notes were reviewed. REVIEW OF SYSTEMS    (2-9 systems for level 4, 10 or more for level 5)     Review of Systems   Constitutional: Negative for fever. HENT: Negative for sore throat. Respiratory: Positive for cough, shortness of breath and wheezing. Cardiovascular: Negative for chest pain. Gastrointestinal: Negative for abdominal pain. Neurological: Negative for headaches. All other systems reviewed and are negative. Except as noted above the remainder of the review of systems was reviewed and negative.        PAST MEDICAL HISTORY     Past Medical History:   Diagnosis Date    Anxiety     B[facility] disorder ([facility] [address]     HTN (hypertension)     Hyperlipidemia          SURGICAL

## 2018-10-10 LAB
EKG P AXIS: 61 DEGREES
EKG P-R INTERVAL: 178 MS
EKG Q-T INTERVAL: 328 MS
EKG QRS DURATION: 78 MS
EKG QTC CALCULATION (BAZETT): 429 MS
EKG T AXIS: 49 DEGREES

## 2019-01-17 ENCOUNTER — TELEPHONE (OUTPATIENT)
Dept: PODIATRY | Facility: CLINIC | Age: 44
End: 2019-01-17

## 2019-01-17 NOTE — TELEPHONE ENCOUNTER
Left message and callback number reminding patient of appointment with Dr. Jermaine Goodson on January 18, 2019 at 8:30 am.

## 2019-01-18 ENCOUNTER — TELEPHONE (OUTPATIENT)
Dept: PODIATRY | Facility: CLINIC | Age: 44
End: 2019-01-18

## 2019-04-17 ENCOUNTER — HOSPITAL ENCOUNTER (OUTPATIENT)
Dept: GENERAL RADIOLOGY | Age: 44
Discharge: HOME | End: 2019-04-17
Payer: MEDICARE

## 2019-04-17 ENCOUNTER — OFFICE VISIT (OUTPATIENT)
Dept: URGENT CARE | Age: 44
End: 2019-04-17
Payer: MEDICARE

## 2019-04-17 VITALS
BODY MASS INDEX: 33.82 KG/M2 | SYSTOLIC BLOOD PRESSURE: 107 MMHG | OXYGEN SATURATION: 98 % | DIASTOLIC BLOOD PRESSURE: 74 MMHG | HEART RATE: 69 BPM | HEIGHT: 70 IN | TEMPERATURE: 99 F | WEIGHT: 236.2 LBS | RESPIRATION RATE: 20 BRPM

## 2019-04-17 DIAGNOSIS — M54.6 ACUTE LEFT-SIDED THORACIC BACK PAIN: ICD-10-CM

## 2019-04-17 DIAGNOSIS — M51.34 DEGENERATIVE DISC DISEASE, THORACIC: Primary | ICD-10-CM

## 2019-04-17 PROCEDURE — 72072 X-RAY EXAM THORAC SPINE 3VWS: CPT

## 2019-04-17 PROCEDURE — 99213 OFFICE O/P EST LOW 20 MIN: CPT | Performed by: SPECIALIST

## 2019-04-17 PROCEDURE — G8417 CALC BMI ABV UP PARAM F/U: HCPCS | Performed by: SPECIALIST

## 2019-04-17 PROCEDURE — 4004F PT TOBACCO SCREEN RCVD TLK: CPT | Performed by: SPECIALIST

## 2019-04-17 PROCEDURE — G8427 DOCREV CUR MEDS BY ELIG CLIN: HCPCS | Performed by: SPECIALIST

## 2019-04-17 PROCEDURE — 96372 THER/PROPH/DIAG INJ SC/IM: CPT | Performed by: SPECIALIST

## 2019-04-17 RX ORDER — OMEGA-3 FATTY ACIDS CAP DELAYED RELEASE 1000 MG 1000 MG
1000 CAPSULE DELAYED RELEASE ORAL DAILY
COMMUNITY
End: 2021-07-02

## 2019-04-17 RX ORDER — KETOROLAC TROMETHAMINE 30 MG/ML
60 INJECTION, SOLUTION INTRAMUSCULAR; INTRAVENOUS ONCE
Status: COMPLETED | OUTPATIENT
Start: 2019-04-17 | End: 2019-04-17

## 2019-04-17 RX ORDER — TIZANIDINE 4 MG/1
4 TABLET ORAL EVERY 6 HOURS PRN
Qty: 30 TABLET | Refills: 1 | Status: SHIPPED | OUTPATIENT
Start: 2019-04-17 | End: 2019-07-13 | Stop reason: ALTCHOICE

## 2019-04-17 RX ORDER — DEXAMETHASONE SODIUM PHOSPHATE 10 MG/ML
10 INJECTION INTRAMUSCULAR; INTRAVENOUS ONCE
Status: COMPLETED | OUTPATIENT
Start: 2019-04-17 | End: 2019-04-17

## 2019-04-17 RX ORDER — METHYLPREDNISOLONE 4 MG/1
TABLET ORAL
Qty: 1 KIT | Refills: 0 | Status: SHIPPED | OUTPATIENT
Start: 2019-04-17 | End: 2019-06-27 | Stop reason: ALTCHOICE

## 2019-04-17 RX ADMIN — KETOROLAC TROMETHAMINE 60 MG: 30 INJECTION, SOLUTION INTRAMUSCULAR; INTRAVENOUS at 12:12

## 2019-04-17 RX ADMIN — DEXAMETHASONE SODIUM PHOSPHATE 10 MG: 10 INJECTION INTRAMUSCULAR; INTRAVENOUS at 12:11

## 2019-04-17 ASSESSMENT — ENCOUNTER SYMPTOMS
BOWEL INCONTINENCE: 0
BACK PAIN: 1

## 2019-04-17 NOTE — PATIENT INSTRUCTIONS
Patient Education        Learning About Degenerative Disc Disease  What is degenerative disc disease? Degenerative disc disease is not really a disease. It's a term used to describe the normal changes in your spinal discs as you age. Spinal discs are small, spongy discs that separate the bones (vertebrae) that make up the spine. The discs act as shock absorbers for the spine, so it can flex, bend, and twist.  Degenerative disc disease can take place in one or more places along the spine. It most often occurs in the discs in the lower back and the neck. What causes it? As we age, our spinal discs break down, or degenerate. This breakdown causes the symptoms of degenerative disc disease in some people. When the discs break down, they can lose fluid and dry out, and their outer layers can have tiny cracks or tears. This leads to less padding and less space between the bones in the spine. The body reacts to this by making bony growths on the spine called bone spurs. These spurs can press on the spinal nerve roots or spinal cord. This can cause pain and can affect how well the nerves work. What are the symptoms? Many people with degenerative disc disease have no pain. But others have severe pain or other symptoms that limit their activities. Some of the most common symptoms are:  · Pain in the back or neck. Where the pain occurs depends on which discs are affected. · Pain that gets worse when you move, such as bending over, reaching up, or twisting. · Pain that may occur in the rear end (buttocks), arm, or leg if a nerve is pinched. · Numbness or tingling in your arm or leg. How is it diagnosed? A doctor can often diagnose degenerative disc disease while doing a physical exam. If your exam shows no signs of a serious condition, imaging tests (such as an X-ray) aren't likely to help your doctor find the cause of your symptoms.   Sometimes degenerative disc disease is found when an X-ray is taken for another reason, such as an injury or other health problem. But even if the doctor finds degenerative disc disease, that doesn't always mean that you will have symptoms. How is it treated? There are several things you can do at home to manage pain from this problem. · To relieve pain, use ice or heat (whichever feels better) on the affected area. ? Put ice or a cold pack on the area for 10 to 20 minutes at a time. Put a thin cloth between the ice and your skin. ? Put a warm water bottle, a heating pad set on low, or a warm cloth on your back. Put a thin cloth between the heating pad and your skin. Do not go to sleep with a heating pad on your skin. · Ask your doctor if you can take acetaminophen (such as Tylenol) or nonsteroidal anti-inflammatory drugs, such as ibuprofen or naproxen. Your doctor can prescribe stronger medicines if needed. Be safe with medicines. Read and follow all instructions on the label. · Get some exercise every day. Exercise is one of the best ways to help your back feel better and stay better. It's best to start each exercise slowly. You may notice a little soreness, and that's okay. But if an exercise makes your pain worse, stop doing it. Here are things you can try:  ? Walking. It's the simplest and maybe the best activity for your back. It gets your blood moving and helps your muscles stay strong. ? Exercises that gently stretch and strengthen your stomach, back, and leg muscles. The stronger those muscles are, the better they're able to protect your back. If you have constant or severe pain in your back or spine, you may need other treatments, such as physical therapy. In some cases, your doctor may suggest surgery. Follow-up care is a key part of your treatment and safety. Be sure to make and go to all appointments, and call your doctor if you are having problems. It's also a good idea to know your test results and keep a list of the medicines you take. Where can you learn more?   Go to

## 2019-04-17 NOTE — PROGRESS NOTES
Toradol 60mg given intramuscularly to left gluteal. Pt tolerated well. DEXAMETHASONE 10 MG GIVEN INTRAMUSCULARLY IN RIGHT GLUTEAL. PT TOLERATED WELL.

## 2019-04-17 NOTE — PROGRESS NOTES
[address] [facility]  [address] [place] [address]  Dept: [phone]  Loc: [phone]    Leyda Mireles is a 38 y.o. male who presents today for his medical conditions/complaintsas noted below. Leyda Mireles is c/o of Back Pain (upper left back pain that started 4-5 days ago - laying on back makes it better - certain ways that he twists makes it worse - feels like pressure - not injury noted)        HPI:     Patient denies any s/s of cauda equina syndrome. He denies any known injury. Back Pain   This is a new problem. The current episode started in the past 7 days. The problem has been gradually worsening since onset. The pain is present in the thoracic spine. The quality of the pain is described as stabbing. The pain does not radiate. The pain is at a severity of 7/10. The pain is moderate. The symptoms are aggravated by position and twisting. Stiffness is present all day. Pertinent negatives include no bladder incontinence, bowel incontinence, paresthesias, perianal numbness or tingling. Risk factors include poor posture and obesity. He has tried nothing for the symptoms. Past Medical History:   Diagnosis Date    Anxiety     Bipolar disorder ([address]     HTN (hypertension)     Hyperlipidemia      Past Surgical History:   Procedure Laterality Date    LAP BAND  placed 2009 with removal in 2011    PILONIDAL CYST EXCISION      WISDOM TOOTH EXTRACTION         No family history on file. Social History     Tobacco Use    Smoking status: Never Smoker    Smokeless tobacco: Current User    Tobacco comment: patient does use a vape   Substance Use Topics    Alcohol use: No     Alcohol/week: 0.0 oz      Current Outpatient Medications   Medication Sig Dispense Refill    Omega-3 Fatty Acids (FISH OIL) 1000 MG CPDR Take 1,000 mg by mouth daily      methylPREDNISolone (MEDROL DOSEPACK) 4 MG tablet START Thursday, Take by mouth.  1 kit 0  tiZANidine (ZANAFLEX) 4 MG tablet Take 1 tablet by mouth every 6 hours as needed (back pain) 30 tablet 1    JANUVIA 50 MG tablet TAKE 1 TABLET EVERY DAY 90 tablet 0    carvedilol (COREG) 6.25 MG tablet TAKE 1 TABLET TWICE DAILY WITH MEALS 180 tablet 0    atorvastatin (LIPITOR) 10 MG tablet TAKE 1 TABLET EVERY DAY 90 tablet 3    metFORMIN (GLUCOPHAGE) 1000 MG tablet TAKE 1 TABLET TWICE DAILY WITH MEALS 180 tablet 0    lisinopril (PRINIVIL;ZESTRIL) 40 MG tablet TAKE 1 TABLET EVERY DAY 90 tablet 0    ARIPiprazole (ABILIFY) 15 MG tablet TAKE 1 TABLET EVERY DAY 90 tablet 0    ibuprofen (ADVIL;MOTRIN) 200 MG tablet Take 600 mg by mouth every 12 hours      Potassium Gluconate 550 MG TABS Take 550 mg by mouth nightly       albuterol sulfate HFA (PROAIR HFA) 108 (90 Base) MCG/ACT inhaler Inhale 2 puffs into the lungs every 6 hours as needed for Wheezing 1 Inhaler 0    fluticasone (FLONASE) 50 MCG/ACT nasal spray 1 spray by Nasal route daily 1 Bottle 3    famotidine (PEPCID) 20 MG tablet Take 20 mg by mouth daily       Current Facility-Administered Medications   Medication Dose Route Frequency Provider Last Rate Last Dose    cefTRIAXone (ROCEPHIN) injection 1 g  1 g Intramuscular Once CATHIE Fermin        ipratropium-albuterol (DUONEB) nebulizer solution 1 ampule  1 ampule Inhalation Once CATIHE Fermin         Allergies   Allergen Reactions    Oxycodone-Acetaminophen Other (See Comments)     Feels like \"bugs are crawling on me\"    Percocet [Oxycodone-Acetaminophen] Other (See Comments)     Feels like \"bugs\" are crawling on his body.        Health Maintenance   Topic Date Due    Diabetic retinal exam  03/29/1984    HIV screen  03/29/1989    Hepatitis B Vaccine (1 of 3 - Risk 3-dose series) 03/29/1993    Diabetic microalbuminuria test  10/16/2018    Diabetic foot exam  04/11/2019    A1C test (Diabetic or Prediabetic)  04/16/2019    Lipid screen  04/16/2019    Flu vaccine (Season Ended) 09/01/2019    Potassium monitoring  10/09/2019    Creatinine monitoring  10/09/2019    DTaP/Tdap/Td vaccine (2 - Td) 02/16/2026    Pneumococcal 0-64 years Vaccine  Completed       Subjective:     Review of Systems   Gastrointestinal: Negative for bowel incontinence. Genitourinary: Negative for bladder incontinence. Musculoskeletal: Positive for back pain. Skin: Negative. Neurological: Negative for tingling and paresthesias. OBJECTIVE:     Physical Exam   Constitutional: Vital signs are normal. He appears well-developed and well-nourished. He is cooperative. HENT:   Head: Normocephalic and atraumatic. Cardiovascular: Normal rate, regular rhythm, S1 normal, S2 normal and normal heart sounds. Pulmonary/Chest: Effort normal and breath sounds normal.   Musculoskeletal:        Thoracic back: He exhibits decreased range of motion (due to pain with flexion), tenderness and pain. Back:    Neurological: He is alert. Psychiatric: He has a normal mood and affect. His speech is normal and behavior is normal. Thought content normal.   Nursing note and vitals reviewed. /74   Pulse 69   Temp 99 °F (37.2 °C) (Oral)   Resp 20   Ht 5' 10\" (1.778 m)   Wt 236 lb 3.2 oz (107.1 kg)   SpO2 98%   BMI 33.89 kg/m²     ASSESSMENT:       Diagnosis Orders   1. Degenerative disc disease, thoracic  ketorolac (TORADOL) injection 60 mg    dexamethasone (DECADRON) injection 10 mg    methylPREDNISolone (MEDROL DOSEPACK) 4 MG tablet    tiZANidine (ZANAFLEX) 4 MG tablet   2. Acute left-sided thoracic back pain  methylPREDNISolone (MEDROL DOSEPACK) 4 MG tablet    tiZANidine (ZANAFLEX) 4 MG tablet     1. No definite acute fracture. 2. Minimal wedge shape of T12 may be developmental or due to an old   injury. It appears stable on the lateral chest x-ray on 10/9/2018. 3. Degenerative disc disease at multiple levels.    Signed by Dr Arndt [facility] on 4/17/2019 11:48 AM       PLAN:    No orders of the between the bones in the spine. The body reacts to this by making bony growths on the spine called bone spurs. These spurs can press on the spinal nerve roots or spinal cord. This can cause pain and can affect how well the nerves work. What are the symptoms? Many people with degenerative disc disease have no pain. But others have severe pain or other symptoms that limit their activities. Some of the most common symptoms are:  · Pain in the back or neck. Where the pain occurs depends on which discs are affected. · Pain that gets worse when you move, such as bending over, reaching up, or twisting. · Pain that may occur in the rear end (buttocks), arm, or leg if a nerve is pinched. · Numbness or tingling in your arm or leg. How is it diagnosed? A doctor can often diagnose degenerative disc disease while doing a physical exam. If your exam shows no signs of a serious condition, imaging tests (such as an X-ray) aren't likely to help your doctor find the cause of your symptoms. Sometimes degenerative disc disease is found when an X-ray is taken for another reason, such as an injury or other health problem. But even if the doctor finds degenerative disc disease, that doesn't always mean that you will have symptoms. How is it treated? There are several things you can do at home to manage pain from this problem. · To relieve pain, use ice or heat (whichever feels better) on the affected area. ? Put ice or a cold pack on the area for 10 to 20 minutes at a time. Put a thin cloth between the ice and your skin. ? Put a warm water bottle, a heating pad set on low, or a warm cloth on your back. Put a thin cloth between the heating pad and your skin. Do not go to sleep with a heating pad on your skin. · Ask your doctor if you can take acetaminophen (such as Tylenol) or nonsteroidal anti-inflammatory drugs, such as ibuprofen or naproxen. Your doctor can prescribe stronger medicines if needed. Be safe with medicines. Read and follow all instructions on the label. · Get some exercise every day. Exercise is one of the best ways to help your back feel better and stay better. It's best to start each exercise slowly. You may notice a little soreness, and that's okay. But if an exercise makes your pain worse, stop doing it. Here are things you can try:  ? Walking. It's the simplest and maybe the best activity for your back. It gets your blood moving and helps your muscles stay strong. ? Exercises that gently stretch and strengthen your stomach, back, and leg muscles. The stronger those muscles are, the better they're able to protect your back. If you have constant or severe pain in your back or spine, you may need other treatments, such as physical therapy. In some cases, your doctor may suggest surgery. Follow-up care is a key part of your treatment and safety. Be sure to make and go to all appointments, and call your doctor if you are having problems. It's also a good idea to know your test results and keep a list of the medicines you take. Where can you learn more? Go to [email or web address] and sign in to your [employer] account. Enter L907 in the [employer] box to learn more about \"Learning About Degenerative Disc Disease. \"     If you do not have an account, please click on the \"Sign Up Now\" link. Current as of: September 20, 2018  Content Version: 11.9  © 3115-9358 [employer]. Care instructions adapted under license by [facility] ([facility]). If you have questions about a medical condition or this instruction, always ask your healthcare professional. [place] [address] any warranty or liability for your use of this information. 1.  Follow up with Dr. Alexy Carl as soon as possible. You need further testing such as MRI of thoracic area. 2.  STOP smoking. 3.  Push fluids. 4.  May use Tylenol Arthritis for back pain.           Electronically signed by Nanci Velazquez APRN - NP on 4/17/2019 at 1:04 PM Asc Procedure Text (A): After obtaining clear surgical margins the patient was sent to an ASC for surgical repair.  The patient understands they will receive post-surgical care and follow-up from the ASC physician.

## 2019-04-18 ENCOUNTER — OFFICE VISIT (OUTPATIENT)
Dept: PRIMARY CARE CLINIC | Age: 44
End: 2019-04-18
Payer: MEDICARE

## 2019-04-18 VITALS
HEIGHT: 70 IN | BODY MASS INDEX: 34.07 KG/M2 | SYSTOLIC BLOOD PRESSURE: 118 MMHG | TEMPERATURE: 98 F | WEIGHT: 238 LBS | DIASTOLIC BLOOD PRESSURE: 70 MMHG | HEART RATE: 68 BPM | OXYGEN SATURATION: 98 %

## 2019-04-18 DIAGNOSIS — M54.6 THORACIC SPINE PAIN: Primary | ICD-10-CM

## 2019-04-18 DIAGNOSIS — E78.2 MIXED HYPERLIPIDEMIA: ICD-10-CM

## 2019-04-18 DIAGNOSIS — S22.080D CLOSED WEDGE COMPRESSION FRACTURE OF TWELFTH THORACIC VERTEBRA WITH ROUTINE HEALING, SUBSEQUENT ENCOUNTER: ICD-10-CM

## 2019-04-18 DIAGNOSIS — E11.8 CONTROLLED TYPE 2 DIABETES MELLITUS WITH COMPLICATION, WITHOUT LONG-TERM CURRENT USE OF INSULIN (HCC): ICD-10-CM

## 2019-04-18 DIAGNOSIS — E11.8 CONTROLLED TYPE 2 DIABETES MELLITUS WITH COMPLICATION, WITHOUT LONG-TERM CURRENT USE OF INSULIN (HCC): Primary | ICD-10-CM

## 2019-04-18 DIAGNOSIS — I10 ESSENTIAL HYPERTENSION: ICD-10-CM

## 2019-04-18 DIAGNOSIS — R93.7 ABNORMAL MUSCULOSKELETAL DIAGNOSTIC IMAGING: ICD-10-CM

## 2019-04-18 LAB
ALBUMIN SERPL-MCNC: 4.8 G/DL (ref 3.5–5.2)
ALP BLD-CCNC: 55 U/L (ref 40–130)
ALT SERPL-CCNC: 29 U/L (ref 5–41)
ANION GAP SERPL CALCULATED.3IONS-SCNC: 13 MMOL/L (ref 7–19)
AST SERPL-CCNC: 22 U/L (ref 5–40)
BILIRUB SERPL-MCNC: 0.9 MG/DL (ref 0.2–1.2)
BUN BLDV-MCNC: 13 MG/DL (ref 6–20)
CALCIUM SERPL-MCNC: 9.9 MG/DL (ref 8.6–10)
CHLORIDE BLD-SCNC: 101 MMOL/L (ref 98–111)
CHOLESTEROL, TOTAL: 121 MG/DL (ref 160–199)
CO2: 25 MMOL/L (ref 22–29)
CREAT SERPL-MCNC: 0.6 MG/DL (ref 0.5–1.2)
CREATININE URINE: 209.4 MG/DL (ref 4.2–622)
GFR NON-AFRICAN AMERICAN: >60
GLUCOSE BLD-MCNC: 133 MG/DL (ref 74–109)
HBA1C MFR BLD: 5.8 % (ref 4–6)
HCT VFR BLD CALC: 46.6 % (ref 42–52)
HDLC SERPL-MCNC: 40 MG/DL (ref 55–121)
HEMOGLOBIN: 15.6 G/DL (ref 14–18)
LDL CHOLESTEROL CALCULATED: 69 MG/DL
MCH RBC QN AUTO: 30.8 PG (ref 27–31)
MCHC RBC AUTO-ENTMCNC: 33.5 G/DL (ref 33–37)
MCV RBC AUTO: 92.1 FL (ref 80–94)
MICROALBUMIN UR-MCNC: 1.5 MG/DL (ref 0–19)
MICROALBUMIN/CREAT UR-RTO: 7.2 MG/G
PDW BLD-RTO: 12.8 % (ref 11.5–14.5)
PLATELET # BLD: 221 K/UL (ref 130–400)
PMV BLD AUTO: 11.8 FL (ref 9.4–12.4)
POTASSIUM SERPL-SCNC: 4.8 MMOL/L (ref 3.5–5)
RBC # BLD: 5.06 M/UL (ref 4.7–6.1)
SODIUM BLD-SCNC: 139 MMOL/L (ref 136–145)
TOTAL PROTEIN: 7.7 G/DL (ref 6.6–8.7)
TRIGL SERPL-MCNC: 59 MG/DL (ref 0–149)
WBC # BLD: 24.2 K/UL (ref 4.8–10.8)

## 2019-04-18 PROCEDURE — G8417 CALC BMI ABV UP PARAM F/U: HCPCS | Performed by: NURSE PRACTITIONER

## 2019-04-18 PROCEDURE — 99213 OFFICE O/P EST LOW 20 MIN: CPT | Performed by: NURSE PRACTITIONER

## 2019-04-18 PROCEDURE — G8427 DOCREV CUR MEDS BY ELIG CLIN: HCPCS | Performed by: NURSE PRACTITIONER

## 2019-04-18 PROCEDURE — 4004F PT TOBACCO SCREEN RCVD TLK: CPT | Performed by: NURSE PRACTITIONER

## 2019-04-18 RX ORDER — PENICILLIN V POTASSIUM 500 MG/1
TABLET ORAL
Refills: 0 | COMMUNITY
Start: 2019-04-15 | End: 2019-06-27 | Stop reason: ALTCHOICE

## 2019-04-18 ASSESSMENT — ENCOUNTER SYMPTOMS
RESPIRATORY NEGATIVE: 1
EYES NEGATIVE: 1
GASTROINTESTINAL NEGATIVE: 1
BACK PAIN: 1

## 2019-04-18 ASSESSMENT — PATIENT HEALTH QUESTIONNAIRE - PHQ9
SUM OF ALL RESPONSES TO PHQ QUESTIONS 1-9: 0
1. LITTLE INTEREST OR PLEASURE IN DOING THINGS: 0
SUM OF ALL RESPONSES TO PHQ QUESTIONS 1-9: 0
2. FEELING DOWN, DEPRESSED OR HOPELESS: 0
SUM OF ALL RESPONSES TO PHQ9 QUESTIONS 1 & 2: 0

## 2019-04-18 NOTE — PROGRESS NOTES
[facility] PR[facility]  [address]  [address] [facility] [address]  Dept: [phone]  Dept Fax: [phone]  Loc: [phone]    Rob Trevino is a 38 y.o. male who presents today for his medical conditions/complaints as noted below. Rob Trevino is c/o of Back Pain (upper left back-follow up urgent care)      Chief Complaint   Patient presents with    Back Pain     upper left back-follow up urgent care       HPI:     HPI   Patient here with complaints of continued back pain. He went to Urgent Care yesterday due to pain. He was given injection in UC and xray completed. Xray did show minimal wedge shape of t12 possible compression fracture. He denied injury, but xray was indeterminate if this was a new finding. Patient was given injection in office and started on Zanaflex and oral steroids. Past Medical History:   Diagnosis Date    Anxiety     Bipolar disorder ([address]     HTN (hypertension)     Hyperlipidemia         Past Surgical History:   Procedure Laterality Date    LAP BAND  placed 2009 with removal in 2011    PILONIDAL CYST EXCISION      WISDOM TOOTH EXTRACTION         Social History     Tobacco Use    Smoking status: Current Some Day Smoker     Types: Cigars    Smokeless tobacco: Never Used    Tobacco comment: patient does use a vape   Substance Use Topics    Alcohol use: No     Alcohol/week: 0.0 oz        Current Outpatient Medications   Medication Sig Dispense Refill    Omega-3 Fatty Acids (FISH OIL) 1000 MG CPDR Take 1,000 mg by mouth daily      methylPREDNISolone (MEDROL DOSEPACK) 4 MG tablet START Thursday, Take by mouth.  1 kit 0    tiZANidine (ZANAFLEX) 4 MG tablet Take 1 tablet by mouth every 6 hours as needed (back pain) 30 tablet 1    JANUVIA 50 MG tablet TAKE 1 TABLET EVERY DAY 90 tablet 0    carvedilol (COREG) 6.25 MG tablet TAKE 1 TABLET TWICE DAILY WITH MEALS 180 tablet 0    atorvastatin (LIPITOR) 10 MG tablet TAKE 1 TABLET EVERY DAY 90 tablet 3    metFORMIN (GLUCOPHAGE) 1000 MG tablet TAKE 1 TABLET TWICE DAILY WITH MEALS 180 tablet 0    lisinopril (PRINIVIL;ZESTRIL) 40 MG tablet TAKE 1 TABLET EVERY DAY 90 tablet 0    ARIPiprazole (ABILIFY) 15 MG tablet TAKE 1 TABLET EVERY DAY 90 tablet 0    albuterol sulfate HFA (PROAIR HFA) 108 (90 Base) MCG/ACT inhaler Inhale 2 puffs into the lungs every 6 hours as needed for Wheezing 1 Inhaler 0    fluticasone (FLONASE) 50 MCG/ACT nasal spray 1 spray by Nasal route daily 1 Bottle 3    ibuprofen (ADVIL;MOTRIN) 200 MG tablet Take 600 mg by mouth every 12 hours      famotidine (PEPCID) 20 MG tablet Take 20 mg by mouth daily      Potassium Gluconate 550 MG TABS Take 550 mg by mouth nightly       penicillin v potassium (VEETID) 500 MG tablet TK 2 TS PO NOW THEN 1 T QID  0     Current Facility-Administered Medications   Medication Dose Route Frequency Provider Last Rate Last Dose    cefTRIAXone (ROCEPHIN) injection 1 g  1 g Intramuscular Once CATHIE Fermin        ipratropium-albuterol (DUONEB) nebulizer solution 1 ampule  1 ampule Inhalation Once CATHIE Fermin           Allergies   Allergen Reactions    Oxycodone-Acetaminophen Other (See Comments)     Feels like \"bugs are crawling on me\"    Percocet [Oxycodone-Acetaminophen] Other (See Comments)     Feels like \"bugs\" are crawling on his body. History reviewed. No pertinent family history. Subjective:      Review of Systems   Constitutional: Negative. HENT: Negative. Eyes: Negative. Respiratory: Negative. Cardiovascular: Negative. Gastrointestinal: Negative. Endocrine: Negative. Genitourinary: Negative. Musculoskeletal: Positive for back pain (thoracic). Skin: Negative. Neurological: Negative. Hematological: Negative. Psychiatric/Behavioral: Negative. Objective:     Physical Exam   Constitutional: He is oriented to person, place, and time.  Vital signs are normal. He appears well-developed and well-nourished. HENT:   Head: Normocephalic and atraumatic. Right Ear: Hearing and external ear normal.   Left Ear: Hearing and external ear normal.   Nose: Nose normal.   Mouth/Throat: Uvula is midline, oropharynx is clear and moist and mucous membranes are normal.   Eyes: Pupils are equal, round, and reactive to light. Conjunctivae, EOM and lids are normal.   Neck: Trachea normal and normal range of motion. Neck supple. No thyroid mass and no thyromegaly present. Cardiovascular: Normal rate, regular rhythm, normal heart sounds and intact distal pulses. Pulmonary/Chest: Effort normal and breath sounds normal.   Abdominal: Soft. Normal appearance and bowel sounds are normal.   Musculoskeletal:        Cervical back: Normal. He exhibits normal range of motion and no tenderness. Thoracic back: He exhibits decreased range of motion and tenderness. Lumbar back: Normal. He exhibits normal range of motion and no tenderness. Back:    Neurological: He is alert and oriented to person, place, and time. He has normal strength. Skin: Skin is warm, dry and intact. Psychiatric: He has a normal mood and affect. His speech is normal and behavior is normal. Judgment and thought content normal. Cognition and memory are normal.   Nursing note and vitals reviewed. /70   Pulse 68   Temp 98 °F (36.7 °C) (Temporal)   Ht 5' 10\" (1.778 m)   Wt 238 lb (108 kg)   SpO2 98%   BMI 34.15 kg/m²     Assessment:      Diagnosis Orders   1. Thoracic spine pain  MRI THORACIC SPINE WO CONTRAST   2. Abnormal musculoskeletal diagnostic imaging  MRI THORACIC SPINE WO CONTRAST   3. Closed wedge compression fracture of twelfth thoracic vertebra with routine healing, subsequent encounter  MRI THORACIC SPINE WO CONTRAST       No results found for this visit on 04/18/19. Plan:     Due to worsening pain and xray results, I am ordering MRI of thoracic region.   Biofreeze provided in office. Patient to RTC in 3 weeks to monitor overall status and DM management. Return in about 3 weeks (around 5/9/2019) for Follow up chronic conditions and back pain. Orders Placed This Encounter   Procedures    MRI THORACIC SPINE WO CONTRAST     Standing Status:   Future     Standing Expiration Date:   4/18/2020       No orders of the defined types were placed in this encounter. Patient offered educational handouts and has had all questions answered. Patient voices understanding and agrees to plans along with risks and benefits of plan. Patient is instructed to continue prior meds, diet, and exercise plans as instructed. Patient agrees to follow up as instructed and sooner if needed. Patient agrees to go to ER if condition becomes emergent. EMR Dragon/transcription disclaimer: Some of this encounter note is an electronic transcription/translation of spoken language to printed text. The electronic translation of spoken language may permit erroneous, or at times, nonsensical words or phrases to be inadvertently transcribed.  Although I have reviewed the note for such errors, some may still exist.    Electronically signed by CATHIE Chung on 4/18/2019 at 10:52 AM

## 2019-04-24 ENCOUNTER — TELEPHONE (OUTPATIENT)
Dept: PRIMARY CARE CLINIC | Age: 44
End: 2019-04-24

## 2019-04-24 DIAGNOSIS — D72.9 ABNORMAL WBC COUNT: Primary | ICD-10-CM

## 2019-04-24 NOTE — TELEPHONE ENCOUNTER
----- Message from Colby Wilson MD sent at 4/19/2019  7:07 AM CDT -----  Please inform patient of abnormal test results. Normal Hgb A1c at 5.8. Microalbumin normal. CMP normal. Cholesterol at goal. Significantly elevated WBC. Has he been on any steroids or being sick?   Obtain CBC with manual differential.

## 2019-04-26 ENCOUNTER — HOSPITAL ENCOUNTER (OUTPATIENT)
Dept: MRI IMAGING | Age: 44
Discharge: HOME | End: 2019-04-26
Payer: MEDICARE

## 2019-04-26 DIAGNOSIS — R93.7 ABNORMAL MUSCULOSKELETAL DIAGNOSTIC IMAGING: ICD-10-CM

## 2019-04-26 DIAGNOSIS — S22.080D CLOSED WEDGE COMPRESSION FRACTURE OF TWELFTH THORACIC VERTEBRA WITH ROUTINE HEALING, SUBSEQUENT ENCOUNTER: ICD-10-CM

## 2019-04-26 DIAGNOSIS — M54.6 THORACIC SPINE PAIN: ICD-10-CM

## 2019-04-26 PROCEDURE — 72146 MRI CHEST SPINE W/O DYE: CPT

## 2019-04-29 DIAGNOSIS — D72.9 ABNORMAL WBC COUNT: ICD-10-CM

## 2019-04-29 LAB
ATYPICAL LYMPHOCYTE RELATIVE PERCENT: 2 % (ref 0–8)
BANDED NEUTROPHILS RELATIVE PERCENT: 3 % (ref 0–5)
BASOPHILS ABSOLUTE: 0 K/UL (ref 0–0.2)
BASOPHILS RELATIVE PERCENT: 0 % (ref 0–1)
EOSINOPHILS ABSOLUTE: 0.19 K/UL (ref 0–0.6)
EOSINOPHILS RELATIVE PERCENT: 2 % (ref 0–5)
HCT VFR BLD CALC: 44.5 % (ref 42–52)
HEMOGLOBIN: 14.7 G/DL (ref 14–18)
LYMPHOCYTES ABSOLUTE: 2 K/UL (ref 1.1–4.5)
LYMPHOCYTES RELATIVE PERCENT: 19 % (ref 20–40)
MCH RBC QN AUTO: 30.3 PG (ref 27–31)
MCHC RBC AUTO-ENTMCNC: 33 G/DL (ref 33–37)
MCV RBC AUTO: 91.8 FL (ref 80–94)
MONOCYTES ABSOLUTE: 0.6 K/UL (ref 0–0.9)
MONOCYTES RELATIVE PERCENT: 6 % (ref 0–10)
NEUTROPHILS ABSOLUTE: 6.7 K/UL (ref 1.5–7.5)
NEUTROPHILS RELATIVE PERCENT: 68 % (ref 50–65)
PDW BLD-RTO: 12.8 % (ref 11.5–14.5)
PLATELET # BLD: 221 K/UL (ref 130–400)
PLATELET SLIDE REVIEW: ADEQUATE
PMV BLD AUTO: 12 FL (ref 9.4–12.4)
RBC # BLD: 4.85 M/UL (ref 4.7–6.1)
RBC # BLD: NORMAL 10*6/UL
WBC # BLD: 9.5 K/UL (ref 4.8–10.8)

## 2019-04-30 ENCOUNTER — TELEPHONE (OUTPATIENT)
Dept: PRIMARY CARE CLINIC | Age: 44
End: 2019-04-30

## 2019-04-30 NOTE — TELEPHONE ENCOUNTER
----- Message from CATHIE Nunez sent at 4/30/2019  3:25 PM CDT -----  Please let patient know that lab results were normal.  Thanks!

## 2019-06-05 ENCOUNTER — HOSPITAL ENCOUNTER (EMERGENCY)
Facility: HOSPITAL | Age: 44
Discharge: HOME | End: 2019-06-05
Admitting: EMERGENCY MEDICINE

## 2019-06-05 ENCOUNTER — APPOINTMENT (OUTPATIENT)
Dept: GENERAL RADIOLOGY | Facility: HOSPITAL | Age: 44
End: 2019-06-05

## 2019-06-05 VITALS
SYSTOLIC BLOOD PRESSURE: 120 MMHG | HEART RATE: 67 BPM | HEIGHT: 70 IN | TEMPERATURE: 97.5 F | OXYGEN SATURATION: 99 % | DIASTOLIC BLOOD PRESSURE: 85 MMHG | WEIGHT: 236 LBS | RESPIRATION RATE: 16 BRPM | BODY MASS INDEX: 33.79 KG/M2

## 2019-06-05 DIAGNOSIS — S83.92XA SPRAIN OF LEFT KNEE, UNSPECIFIED LIGAMENT, INITIAL ENCOUNTER: Primary | ICD-10-CM

## 2019-06-05 PROCEDURE — 99283 EMERGENCY DEPT VISIT LOW MDM: CPT

## 2019-06-05 PROCEDURE — 73562 X-RAY EXAM OF KNEE 3: CPT

## 2019-06-05 RX ORDER — IBUPROFEN 800 MG/1
800 TABLET ORAL ONCE
Status: COMPLETED | OUTPATIENT
Start: 2019-06-05 | End: 2019-06-05

## 2019-06-05 RX ORDER — ETODOLAC 200 MG/1
200 CAPSULE ORAL EVERY 8 HOURS
Qty: 15 CAPSULE | Refills: 0 | Status: SHIPPED | OUTPATIENT
Start: 2019-06-05 | End: 2020-06-28

## 2019-06-05 RX ADMIN — IBUPROFEN 800 MG: 800 TABLET, FILM COATED ORAL at 19:23

## 2019-06-06 NOTE — ED PROVIDER NOTES
"Subjective   History of Present Illness  44-year-old male presents with a chief complaint of left knee pain that has been chronic for years but have progressively been getting worse.  Patient reports that he notes with twisting of his knees while at work as a  he feels pain on the medial aspect of his knee.  Worse with bending his knee better with extension.  No edema redness swelling or fever  Review of Systems   All other systems reviewed and are negative.      Past Medical History:   Diagnosis Date   • Bipolar 1 disorder (CMS/[facility]ly [facility])    • Diabetes mellitus (CMS/Formerly [facility])    • Hypertension        Allergies   Allergen Reactions   • Percocet [Oxycodone-Acetaminophen] Other (See Comments)     Feels like \"bugs are crawling on me\"       Past Surgical History:   Procedure Laterality Date   • COLONOSCOPY     • LAPAROSCOPIC GASTRIC BANDING         No family history on file.    Social History     Socioeconomic History   • Marital status:      Spouse name: Not on file   • Number of children: Not on file   • Years of education: Not on file   • Highest education level: Not on file   Tobacco Use   • Smoking status: Current Every Day Smoker     Types: Electronic Cigarette   • Tobacco comment: former smoker, quit 10 yrs ago   Substance and Sexual Activity   • Alcohol use: No   • Drug use: No           Objective   Physical Exam   Constitutional: He is oriented to person, place, and time. He appears well-developed and well-nourished.   HENT:   Head: Normocephalic and atraumatic.   Eyes: EOM are normal. Pupils are equal, round, and reactive to light.   Neck: Normal range of motion. Neck supple.   Cardiovascular: Normal rate and regular rhythm.   Pulmonary/Chest: Effort normal and breath sounds normal.   Abdominal: Soft. Bowel sounds are normal.   Musculoskeletal: Normal range of motion.   Negative valgus, negative varus stress test, negative anterior drawer negative posterior drawer negative Dee   Neurological: He is " alert and oriented to person, place, and time. No cranial nerve deficit. Coordination normal.   Skin: Skin is warm and dry. Capillary refill takes less than 2 seconds.   Psychiatric: He has a normal mood and affect. His behavior is normal.   Nursing note and vitals reviewed.      Procedures           ED Course                  MDM  Number of Diagnoses or Management Options  Diagnosis management comments: Medial compartment narrowing, will discharge stable condition with orthopedic follow-up       Amount and/or Complexity of Data Reviewed  Tests in the radiology section of CPT®: ordered and reviewed    Risk of Complications, Morbidity, and/or Mortality  Presenting problems: moderate  Diagnostic procedures: moderate  Management options: moderate    Patient Progress  Patient progress: stable        Final diagnoses:   Sprain of left knee, unspecified ligament, initial encounter            Alvaro Marmolejo PA-C  06/05/19 [id number]

## 2019-06-11 ENCOUNTER — OFFICE VISIT (OUTPATIENT)
Dept: PRIMARY CARE CLINIC | Age: 44
End: 2019-06-11
Payer: MEDICARE

## 2019-06-11 VITALS
TEMPERATURE: 99.2 F | HEART RATE: 68 BPM | BODY MASS INDEX: 33.18 KG/M2 | DIASTOLIC BLOOD PRESSURE: 76 MMHG | HEIGHT: 70 IN | SYSTOLIC BLOOD PRESSURE: 102 MMHG | WEIGHT: 231.8 LBS | OXYGEN SATURATION: 98 %

## 2019-06-11 DIAGNOSIS — Z71.6 ENCOUNTER FOR SMOKING CESSATION COUNSELING: ICD-10-CM

## 2019-06-11 DIAGNOSIS — M25.562 CHRONIC PAIN OF LEFT KNEE: Primary | ICD-10-CM

## 2019-06-11 DIAGNOSIS — Z72.0 TOBACCO ABUSE: ICD-10-CM

## 2019-06-11 DIAGNOSIS — Z87.828 HISTORY OF MENISCAL TEAR: ICD-10-CM

## 2019-06-11 DIAGNOSIS — G89.29 CHRONIC PAIN OF LEFT KNEE: Primary | ICD-10-CM

## 2019-06-11 DIAGNOSIS — E11.8 CONTROLLED TYPE 2 DIABETES MELLITUS WITH COMPLICATION, WITHOUT LONG-TERM CURRENT USE OF INSULIN (HCC): ICD-10-CM

## 2019-06-11 DIAGNOSIS — I10 ESSENTIAL HYPERTENSION: ICD-10-CM

## 2019-06-11 PROCEDURE — 2022F DILAT RTA XM EVC RTNOPTHY: CPT | Performed by: NURSE PRACTITIONER

## 2019-06-11 PROCEDURE — G8427 DOCREV CUR MEDS BY ELIG CLIN: HCPCS | Performed by: NURSE PRACTITIONER

## 2019-06-11 PROCEDURE — 3044F HG A1C LEVEL LT 7.0%: CPT | Performed by: NURSE PRACTITIONER

## 2019-06-11 PROCEDURE — 99214 OFFICE O/P EST MOD 30 MIN: CPT | Performed by: NURSE PRACTITIONER

## 2019-06-11 PROCEDURE — 99406 BEHAV CHNG SMOKING 3-10 MIN: CPT | Performed by: NURSE PRACTITIONER

## 2019-06-11 PROCEDURE — 4004F PT TOBACCO SCREEN RCVD TLK: CPT | Performed by: NURSE PRACTITIONER

## 2019-06-11 PROCEDURE — G8417 CALC BMI ABV UP PARAM F/U: HCPCS | Performed by: NURSE PRACTITIONER

## 2019-06-11 RX ORDER — CARVEDILOL 6.25 MG/1
6.25 TABLET ORAL 2 TIMES DAILY WITH MEALS
Qty: 180 TABLET | Refills: 0 | Status: SHIPPED | OUTPATIENT
Start: 2019-06-11 | End: 2019-07-13 | Stop reason: ALTCHOICE

## 2019-06-11 RX ORDER — NICOTINE 21 MG/24HR
1 PATCH, TRANSDERMAL 24 HOURS TRANSDERMAL EVERY 24 HOURS
Qty: 30 PATCH | Refills: 1 | Status: SHIPPED | OUTPATIENT
Start: 2019-06-11 | End: 2019-07-13 | Stop reason: ALTCHOICE

## 2019-06-11 RX ORDER — ATORVASTATIN CALCIUM 10 MG/1
10 TABLET, FILM COATED ORAL DAILY
Qty: 90 TABLET | Refills: 0 | Status: SHIPPED | OUTPATIENT
Start: 2019-06-11 | End: 2019-10-14 | Stop reason: SDUPTHER

## 2019-06-11 RX ORDER — LISINOPRIL 40 MG/1
TABLET ORAL
Qty: 90 TABLET | Refills: 0 | Status: SHIPPED | OUTPATIENT
Start: 2019-06-11 | End: 2019-10-14 | Stop reason: SDUPTHER

## 2019-06-11 RX ORDER — ARIPIPRAZOLE 15 MG/1
15 TABLET ORAL DAILY
Qty: 90 TABLET | Refills: 0 | Status: SHIPPED | OUTPATIENT
Start: 2019-06-11 | End: 2019-10-14 | Stop reason: SDUPTHER

## 2019-06-11 NOTE — PROGRESS NOTES
[address] [address]     Phone:  [phone]  Fax:  [phone]      Nga Leigh is a 38 y.o. male who presents today for his medical conditions/complaints as noted below. Nga Leigh is c/o of Referral - General (MRI) and Medication Refill      Chief Complaint   Patient presents with    Referral - General     MRI    Medication Refill       HPI:     HPI    Nga Leigh presents today for left knee pain. This has been hurting him for 3 weeks. He states the pain is getting worse. His knee hurts more medially. He is wearing a brace on this knee and using ibuprofen for pain. XR at [facility]. He was told to come to his primary care for further imaging. He does have a prevous meniscal tear, he thinks medially. This was treated in [place]. He also needs meds refilled. He also wants to try to quit smoking. He has used nicotine patches in the past and this has helped him quit smoking. He was under intense amounts of stress and started smoking cigars again. He is wanting an option his insurance will cover. He has known HTN and diabetes. Treatment Adherence:   Medication compliance:  compliant most of the time  Diet compliance:  compliant most of the time  Weight trend: stable  Current exercise: no regular exercise  Barriers: impairment:  physical: musculoskeletal pain    Diabetes Mellitus Type 2: Current symptoms/problems include none. Home blood sugar records: patient does not test  Any episodes of hypoglycemia? no  Eye exam current (within one year): unknown  Tobacco history: He  reports that he has been smoking cigars. He has never used smokeless tobacco.       Hypertension:  Home blood pressure monitoring: No.  He is not adherent to a low sodium diet. Patient denies chest pain, shortness of breath and headache. Antihypertensive medication side effects: no medication side effects noted. Use of agents associated with hypertension: none.    Lab Results   Component Value Date    LABA1C 5.8 04/18/2019    LABA1C 6.9 (H) 04/16/2018    LABA1C 7.7 (H) 10/16/2017     Lab Results   Component Value Date    LABMICR 1.50 04/18/2019    CREATININE 0.6 04/18/2019     Lab Results   Component Value Date    ALT 29 04/18/2019    AST 22 04/18/2019     Lab Results   Component Value Date    CHOL 121 (L) 04/18/2019    TRIG 59 04/18/2019    HDL 40 (L) 04/18/2019    LDLCALC 69 04/18/2019          Past Medical History:   Diagnosis Date    Anxiety     Bipolar disorder ([facility]     HTN (hypertension)     Hyperlipidemia         Past Surgical History:   Procedure Laterality Date    LAP BAND  placed 2009 with removal in 2011    PILONIDAL CYST EXCISION      WISDOM TOOTH EXTRACTION         Social History     Tobacco Use    Smoking status: Current Some Day Smoker     Types: Cigars    Smokeless tobacco: Never Used    Tobacco comment: patient does use a vape   Substance Use Topics    Alcohol use: No     Alcohol/week: 0.0 oz        Current Outpatient Medications   Medication Sig Dispense Refill    carvedilol (COREG) 6.25 MG tablet Take 1 tablet by mouth 2 times daily (with meals) 180 tablet 0    ARIPiprazole (ABILIFY) 15 MG tablet Take 1 tablet by mouth daily 90 tablet 0    atorvastatin (LIPITOR) 10 MG tablet Take 1 tablet by mouth daily 90 tablet 0    metFORMIN (GLUCOPHAGE) 1000 MG tablet TAKE 1 TABLET TWICE DAILY WITH MEALS 180 tablet 0    SITagliptin (JANUVIA) 50 MG tablet Take 1 tablet by mouth daily 90 tablet 0    lisinopril (PRINIVIL;ZESTRIL) 40 MG tablet TAKE 1 TABLET EVERY DAY 90 tablet 0    nicotine (NICODERM CQ) 21 MG/24HR Place 1 patch onto the skin every 24 hours 30 patch 1    Omega-3 Fatty Acids (FISH OIL) 1000 MG CPDR Take 1,000 mg by mouth daily      fluticasone (FLONASE) 50 MCG/ACT nasal spray 1 spray by Nasal route daily 1 Bottle 3    ibuprofen (ADVIL;MOTRIN) 200 MG tablet Take 600 mg by mouth every 12 hours      famotidine (PEPCID) 20 MG tablet Take 20 mg time. He appears well-developed and well-nourished. HENT:   Head: Normocephalic and atraumatic. Eyes: Pupils are equal, round, and reactive to light. Neck: Normal range of motion. Cardiovascular: Normal rate, regular rhythm, normal heart sounds and intact distal pulses. Pulmonary/Chest: Effort normal and breath sounds normal. No respiratory distress. He has no wheezes. Abdominal: Soft. Bowel sounds are normal. He exhibits no distension. There is no tenderness. Musculoskeletal:        Left knee: He exhibits decreased range of motion. He exhibits no swelling. Tenderness found. Medial joint line tenderness noted. Lumbar back: He exhibits normal range of motion and no tenderness. Legs:  Lymphadenopathy:     He has no cervical adenopathy. Neurological: He is alert and oriented to person, place, and time. Skin: Skin is warm and dry. No rash noted. Psychiatric: He has a normal mood and affect. His behavior is normal. Thought content normal.   Nursing note and vitals reviewed. /76   Pulse 68   Temp 99.2 °F (37.3 °C) (Temporal)   Ht 5' 10\" (1.778 m)   Wt 231 lb 12.8 oz (105.1 kg)   SpO2 98%   BMI 33.26 kg/m²     Assessment:      Diagnosis Orders   1. Chronic pain of left knee  MRI KNEE LEFT WO CONTRAST   2. Essential hypertension  carvedilol (COREG) 6.25 MG tablet    atorvastatin (LIPITOR) 10 MG tablet    lisinopril (PRINIVIL;ZESTRIL) 40 MG tablet   3. History of meniscal tear  MRI KNEE LEFT WO CONTRAST   4. Controlled type 2 diabetes mellitus with complication, without long-term current use of insulin ([facility])  metFORMIN (GLUCOPHAGE) 1000 MG tablet    SITagliptin (JANUVIA) 50 MG tablet   5. Encounter for smoking cessation counseling     6. Tobacco abuse  nicotine (NICODERM CQ) 21 MG/24HR       No results found for this visit on 06/11/19. Plan:     MRi left knee. Continue brace. Refill meds. Nicoderm 21mcg ordered. Can order 14mcg when needed if insurance will cover. Approximately 5 minutes of education was provided about quit smoking and the harms of tobacco.  Patient does show understanding. Patient has  the desire to quit smoking in the future. Nicotine patches ordered. Can consider other methods if not effective, unaffordable. QUIT NOW [place] SMOKING CESSATION PROGRAM    How Do I Get Started  Quitting tobacco is a process. The first step is the hardest. When you are ready to quit, Quit Now [place] can help you with each step in the quitting process. The Program  Quit Now [place] is a FREE online service available to [place] residents 13years of age and over. When you become a member, you get special tools, a support team of coaches, research-based information, and a community of others trying to become tobacco free. Our expert coaches can talk to you about overcoming common barriers, such as dealing with stress, fighting cravings, coping with irritability, and controlling weight gain. We also offer a free telephone service, so you can speak to a  in person, if you would prefer. Call the [facility] or [phone]. What if I don't qualify for the Program?  You must be 13years of age or older to participate in the program. It is also helpful to discuss quitting with your doctor. How do I enroll in the Quit Now [place] online program?  Complete the brief Enroll Now form. If you are a [place] resident over 13years of age, you will receive an email letting you know that you are enrolled. You can use the online service as often as you want! How do I enroll in both the online and telephone program?  Complete the brief Enroll Now form. If you qualify, you will receive an email letting you know that you are enrolled. You can use the online service as often as you want! While completing the Enroll Now form you indicate the best time for a  to give you a call. If you would like, you can call the QuitLine at [phone]-NOW.   We're here 7 days a week. Monday - Sunday 7 a.m. - 12 a.m. CST  Monday - Sunday 8 a.m. - 1 a.m. EST  If you call when we are closed, please leave a message and we will call you back. Return in about 3 months (around 9/11/2019), or if symptoms worsen or fail to improve, for HTN, hyperlipidemia, Diabetes. Orders Placed This Encounter   Procedures    MRI KNEE LEFT WO CONTRAST     Standing Status:   Future     Standing Expiration Date:   6/11/2020     Order Specific Question:   Reason for exam:     Answer:   left knee pain, previous mensicus tear       Orders Placed This Encounter   Medications    carvedilol (COREG) 6.25 MG tablet     Sig: Take 1 tablet by mouth 2 times daily (with meals)     Dispense:  180 tablet     Refill:  0     NO FURTHER REFILLS UNTIL SEEN IN OFFICE.  ARIPiprazole (ABILIFY) 15 MG tablet     Sig: Take 1 tablet by mouth daily     Dispense:  90 tablet     Refill:  0    atorvastatin (LIPITOR) 10 MG tablet     Sig: Take 1 tablet by mouth daily     Dispense:  90 tablet     Refill:  0    metFORMIN (GLUCOPHAGE) 1000 MG tablet     Sig: TAKE 1 TABLET TWICE DAILY WITH MEALS     Dispense:  180 tablet     Refill:  0    SITagliptin (JANUVIA) 50 MG tablet     Sig: Take 1 tablet by mouth daily     Dispense:  90 tablet     Refill:  0     NO FURTHER REFILLS UNTIL SEEN IN OFFICE.  lisinopril (PRINIVIL;ZESTRIL) 40 MG tablet     Sig: TAKE 1 TABLET EVERY DAY     Dispense:  90 tablet     Refill:  0     NO FURTHER REFILLS UNTIL SEEN IN OFFICE.  nicotine (NICODERM CQ) 21 MG/24HR     Sig: Place 1 patch onto the skin every 24 hours     Dispense:  30 patch     Refill:  1        Patient offered educational materials - see patient instructions for any instruction needed. Discussed use, benefit, and side effects of prescribed medications. All patient questions answered. Instructed to continue current medications, diet and exercise. Patient agreed with treatment plan. Follow up as directed.  Patient was advised to go to the ED if condition ever becomes emergent.          Electronically signed by CATHIE Amezcua on 6/17/2019 at 7:33 AM

## 2019-06-11 NOTE — PATIENT INSTRUCTIONS
QUIT NOW [place] SMOKING CESSATION PROGRAM    How Do I Get Started  Quitting tobacco is a process. The first step is the hardest. When you are ready to quit, Quit Now [place] can help you with each step in the quitting process. The Program  Quit Now [place] is a FREE online service available to [place] residents 13years of age and over. When you become a member, you get special tools, a support team of coaches, research-based information, and a community of others trying to become tobacco free. Our expert coaches can talk to you about overcoming common barriers, such as dealing with stress, fighting cravings, coping with irritability, and controlling weight gain. We also offer a free telephone service, so you can speak to a  in person, if you would prefer. Call the [facility] or [phone]. What if I don't qualify for the Program?  You must be 13years of age or older to participate in the program. It is also helpful to discuss quitting with your doctor. How do I enroll in the Quit Now [place] online program?  Complete the brief Enroll Now form. If you are a [place] resident over 13years of age, you will receive an email letting you know that you are enrolled. You can use the online service as often as you want! How do I enroll in both the online and telephone program?  Complete the brief Enroll Now form. If you qualify, you will receive an email letting you know that you are enrolled. You can use the online service as often as you want! While completing the Enroll Now form you indicate the best time for a  to give you a call. If you would like, you can call the QuitLine at [phone]. We're here 7 days a week. Monday - Sunday 7 a.m. - 12 a.m. CST  Monday - Sunday 8 a.m. - 1 a.m. EST  If you call when we are closed, please leave a message and we will call you back.
6

## 2019-06-17 ASSESSMENT — ENCOUNTER SYMPTOMS
WHEEZING: 0
BACK PAIN: 0
COUGH: 0
EYE PAIN: 0
DIARRHEA: 0
ABDOMINAL PAIN: 0
NAUSEA: 0
VOMITING: 0
SHORTNESS OF BREATH: 0

## 2019-06-27 ENCOUNTER — OFFICE VISIT (OUTPATIENT)
Dept: PRIMARY CARE CLINIC | Age: 44
End: 2019-06-27
Payer: MEDICARE

## 2019-06-27 VITALS
HEIGHT: 70 IN | TEMPERATURE: 97.6 F | WEIGHT: 233 LBS | HEART RATE: 66 BPM | DIASTOLIC BLOOD PRESSURE: 76 MMHG | OXYGEN SATURATION: 99 % | SYSTOLIC BLOOD PRESSURE: 116 MMHG | BODY MASS INDEX: 33.36 KG/M2

## 2019-06-27 DIAGNOSIS — E11.8 CONTROLLED TYPE 2 DIABETES MELLITUS WITH COMPLICATION, WITHOUT LONG-TERM CURRENT USE OF INSULIN (HCC): ICD-10-CM

## 2019-06-27 DIAGNOSIS — R68.82 DECREASED LIBIDO: ICD-10-CM

## 2019-06-27 DIAGNOSIS — H91.90 HEARING LOSS, UNSPECIFIED HEARING LOSS TYPE, UNSPECIFIED LATERALITY: Primary | ICD-10-CM

## 2019-06-27 DIAGNOSIS — F39 MOOD DISORDER (HCC): ICD-10-CM

## 2019-06-27 LAB
ALBUMIN SERPL-MCNC: 4.7 G/DL (ref 3.5–5.2)
ALP BLD-CCNC: 56 U/L (ref 40–130)
ALT SERPL-CCNC: 34 U/L (ref 5–41)
ANION GAP SERPL CALCULATED.3IONS-SCNC: 14 MMOL/L (ref 7–19)
AST SERPL-CCNC: 25 U/L (ref 5–40)
BILIRUB SERPL-MCNC: 0.9 MG/DL (ref 0.2–1.2)
BUN BLDV-MCNC: 7 MG/DL (ref 6–20)
CALCIUM SERPL-MCNC: 9.6 MG/DL (ref 8.6–10)
CHLORIDE BLD-SCNC: 104 MMOL/L (ref 98–111)
CO2: 25 MMOL/L (ref 22–29)
CREAT SERPL-MCNC: 0.5 MG/DL (ref 0.5–1.2)
GFR NON-AFRICAN AMERICAN: >60
GLUCOSE BLD-MCNC: 105 MG/DL (ref 74–109)
HBA1C MFR BLD: 5.8 % (ref 4–6)
HCT VFR BLD CALC: 44.4 % (ref 42–52)
HEMOGLOBIN: 14.4 G/DL (ref 14–18)
MCH RBC QN AUTO: 30.9 PG (ref 27–31)
MCHC RBC AUTO-ENTMCNC: 32.4 G/DL (ref 33–37)
MCV RBC AUTO: 95.3 FL (ref 80–94)
PDW BLD-RTO: 12.7 % (ref 11.5–14.5)
PLATELET # BLD: 222 K/UL (ref 130–400)
PMV BLD AUTO: 11.9 FL (ref 9.4–12.4)
POTASSIUM SERPL-SCNC: 4.1 MMOL/L (ref 3.5–5)
RBC # BLD: 4.66 M/UL (ref 4.7–6.1)
SODIUM BLD-SCNC: 143 MMOL/L (ref 136–145)
TESTOSTERONE TOTAL: 324.9 NG/DL (ref 249–836)
TOTAL PROTEIN: 7.2 G/DL (ref 6.6–8.7)
TSH SERPL DL<=0.05 MIU/L-ACNC: 0.78 UIU/ML (ref 0.27–4.2)
WBC # BLD: 8.8 K/UL (ref 4.8–10.8)

## 2019-06-27 PROCEDURE — G8417 CALC BMI ABV UP PARAM F/U: HCPCS | Performed by: FAMILY MEDICINE

## 2019-06-27 PROCEDURE — 4004F PT TOBACCO SCREEN RCVD TLK: CPT | Performed by: FAMILY MEDICINE

## 2019-06-27 PROCEDURE — 2022F DILAT RTA XM EVC RTNOPTHY: CPT | Performed by: FAMILY MEDICINE

## 2019-06-27 PROCEDURE — G8427 DOCREV CUR MEDS BY ELIG CLIN: HCPCS | Performed by: FAMILY MEDICINE

## 2019-06-27 PROCEDURE — 3044F HG A1C LEVEL LT 7.0%: CPT | Performed by: FAMILY MEDICINE

## 2019-06-27 PROCEDURE — 99214 OFFICE O/P EST MOD 30 MIN: CPT | Performed by: FAMILY MEDICINE

## 2019-06-27 ASSESSMENT — ENCOUNTER SYMPTOMS
COLOR CHANGE: 0
COUGH: 0
SHORTNESS OF BREATH: 0

## 2019-06-27 NOTE — PROGRESS NOTES
MCG/ACT nasal spray 1 spray by Nasal route daily 10/2/18  Yes CATHIE Mensah   ibuprofen (ADVIL;MOTRIN) 200 MG tablet Take 600 mg by mouth every 12 hours   Yes Historical Provider, MD   famotidine (PEPCID) 20 MG tablet Take 20 mg by mouth daily   Yes Historical Provider, MD   Potassium Gluconate 550 MG TABS Take 550 mg by mouth nightly    Yes Historical Provider, MD   nicotine (NICODERM CQ) 21 MG/24HR Place 1 patch onto the skin every 24 hours 6/11/19 6/10/20  CATHIE Ramirez   tiZANidine (ZANAFLEX) 4 MG tablet Take 1 tablet by mouth every 6 hours as needed (back pain) 4/17/19   CATHIE Najera - NP   albuterol sulfate HFA (PROAIR HFA) 108 (90 Base) MCG/ACT inhaler Inhale 2 puffs into the lungs every 6 hours as needed for Wheezing 10/9/18   Kaylin Fuller MD       Past Medical History:   Diagnosis Date    Anxiety     Bipolar disorder ([facility] [address]     HTN (hypertension)     Hyperlipidemia        Past Surgical History:   Procedure Laterality Date    LAP BAND  placed 2009 with removal in 2011    PILONIDAL CYST EXCISION      WISDOM TOOTH EXTRACTION         Social History     Socioeconomic History    Marital status:      Spouse name: None    Number of children: None    Years of education: None    Highest education level: None   Occupational History    None   Social Needs    Financial resource strain: None    Food insecurity:     Worry: None     Inability: None    Transportation needs:     Medical: None     Non-medical: None   Tobacco Use    Smoking status: Current Some Day Smoker     Types: Cigars    Smokeless tobacco: Never Used    Tobacco comment: patient does use a vape   Substance and Sexual Activity    Alcohol use: No     Alcohol/week: 0.0 oz    Drug use: No    Sexual activity: None   Lifestyle    Physical activity:     Days per week: None     Minutes per session: None    Stress: None   Relationships    Social connections:     Talks on phone: None     Gets together: None Attends Evangelical service: None     Active member of club or organization: None     Attends meetings of clubs or organizations: None     Relationship status: None    Intimate partner violence:     Fear of current or ex partner: None     Emotionally abused: None     Physically abused: None     Forced sexual activity: None   Other Topics Concern    None   Social History Narrative    None       Physical Exam   Constitutional: He is oriented to person, place, and time. He appears well-developed and well-nourished. HENT:   Head: Normocephalic and atraumatic. Right Ear: External ear normal.   Left Ear: External ear normal.   Eyes: Conjunctivae are normal. No scleral icterus. Neck: Normal range of motion. Neck supple. Carotid bruit is not present. Cardiovascular: Normal rate, regular rhythm and normal heart sounds. Exam reveals no friction rub. No murmur heard. Pulmonary/Chest: Effort normal and breath sounds normal. No respiratory distress. He has no wheezes. Musculoskeletal: Normal range of motion. Lymphadenopathy:        Right cervical: No superficial cervical and no posterior cervical adenopathy present. Left cervical: No superficial cervical and no posterior cervical adenopathy present. Neurological: He is alert and oriented to person, place, and time. No cranial nerve deficit. Skin: Skin is warm. No rash noted. No erythema. Psychiatric: He has a normal mood and affect. Thought content normal.   Nursing note and vitals reviewed. Assessment    ICD-10-CM    1. Hearing loss, unspecified hearing loss type, unspecified laterality H91.90 Pamela Roman, Audiology, [facility] melisa for hearing screening. TSH without Reflex   2. Decreased libido R68.82 Unclear etiology. Will check TSH without Reflex and testosterone. 3. Mood disorder ([address] F39 CBC     TSH without Reflex   4.  Controlled type 2 diabetes mellitus with complication, without long-term current use of insulin ([facility] E11.8 Hemoglobin A1C     Comprehensive Metabolic Panel           Plan    No orders of the defined types were placed in this encounter. Orders Placed This Encounter   Procedures    CBC    Hemoglobin A1C    Comprehensive Metabolic Panel    TSH without Reflex    Testosterone    Matt Almanza Audiology, [facility]        No follow-ups on file. There are no Patient Instructions on file for this visit.

## 2019-06-28 ENCOUNTER — TELEPHONE (OUTPATIENT)
Dept: PRIMARY CARE CLINIC | Age: 44
End: 2019-06-28

## 2019-06-28 ENCOUNTER — HOSPITAL ENCOUNTER (OUTPATIENT)
Dept: MRI IMAGING | Age: 44
Discharge: HOME | End: 2019-06-28
Payer: MEDICARE

## 2019-06-28 DIAGNOSIS — Z87.828 HISTORY OF MENISCAL TEAR: ICD-10-CM

## 2019-06-28 DIAGNOSIS — G89.29 CHRONIC PAIN OF LEFT KNEE: ICD-10-CM

## 2019-06-28 DIAGNOSIS — M25.562 CHRONIC PAIN OF LEFT KNEE: ICD-10-CM

## 2019-06-28 PROCEDURE — 73721 MRI JNT OF LWR EXTRE W/O DYE: CPT

## 2019-06-28 NOTE — TELEPHONE ENCOUNTER
----- Message from Warren Morales MD sent at 6/28/2019  8:29 AM CDT -----  Please notify patient of normal results. Testosterone normal. TSH normal. CMP normal. CBC normal. No metabolic reasons on decreased libido. Concerned about possible medication related. Possibly related to coreg. Will hold for 2 weeks and see if desire improves. Monitor blood pressure closely.

## 2019-06-28 NOTE — TELEPHONE ENCOUNTER
Please notify patient of normal results. Testosterone normal. TSH normal. CMP normal. CBC normal. No metabolic reasons on decreased libido. Concerned about possible medication related. Possibly related to coreg. Will hold for 2 weeks and see if desire improves. Monitor blood pressure closely.     Patient VU and will call after two weeks with update

## 2019-06-28 NOTE — TELEPHONE ENCOUNTER
----- Message from Gian Mao MD sent at 6/28/2019  8:29 AM CDT -----  Please notify patient of normal results. Testosterone normal. TSH normal. CMP normal. CBC normal. No metabolic reasons on decreased libido. Concerned about possible medication related. Possibly related to coreg. Will hold for 2 weeks and see if desire improves. Monitor blood pressure closely.

## 2019-07-01 ENCOUNTER — PROCEDURE VISIT (OUTPATIENT)
Dept: OTOLARYNGOLOGY | Age: 44
End: 2019-07-01
Payer: MEDICARE

## 2019-07-01 DIAGNOSIS — H93.293 ABNORMAL AUDITORY PERCEPTION OF BOTH EARS: Primary | ICD-10-CM

## 2019-07-01 PROCEDURE — 92550 TYMPANOMETRY & REFLEX THRESH: CPT | Performed by: AUDIOLOGIST

## 2019-07-01 PROCEDURE — 92557 COMPREHENSIVE HEARING TEST: CPT | Performed by: AUDIOLOGIST

## 2019-07-13 ENCOUNTER — OFFICE VISIT (OUTPATIENT)
Dept: URGENT CARE | Age: 44
End: 2019-07-13
Payer: MEDICARE

## 2019-07-13 VITALS
SYSTOLIC BLOOD PRESSURE: 125 MMHG | HEART RATE: 76 BPM | HEIGHT: 70 IN | RESPIRATION RATE: 18 BRPM | WEIGHT: 233 LBS | DIASTOLIC BLOOD PRESSURE: 76 MMHG | BODY MASS INDEX: 33.36 KG/M2 | OXYGEN SATURATION: 98 % | TEMPERATURE: 98.6 F

## 2019-07-13 DIAGNOSIS — K02.9 TOOTH CARIES: Primary | ICD-10-CM

## 2019-07-13 PROCEDURE — 99213 OFFICE O/P EST LOW 20 MIN: CPT | Performed by: NURSE PRACTITIONER

## 2019-07-13 RX ORDER — AMOXICILLIN AND CLAVULANATE POTASSIUM 875; 125 MG/1; MG/1
1 TABLET, FILM COATED ORAL 2 TIMES DAILY
Qty: 20 TABLET | Refills: 0 | Status: SHIPPED | OUTPATIENT
Start: 2019-07-13 | End: 2019-07-23

## 2019-07-13 NOTE — PATIENT INSTRUCTIONS
Patient Education        Abscessed Tooth: Care Instructions  Your Care Instructions    An abscessed tooth is a tooth that has a pocket of pus in the tissues around it. Pus forms when the body tries to fight an infection caused by bacteria. If the pus cannot drain, it forms an abscess. An abscessed tooth can cause red, swollen gums and throbbing pain, especially when you chew. You may have a bad taste in your mouth and a fever, and your jaw may swell. Damage to the tooth, untreated tooth decay, or gum disease can cause an abscessed tooth. An abscessed tooth needs to be treated by a dental professional right away. If it is not treated, the infection could spread to other parts of your body. Your dentist will give you antibiotics to stop the infection. He or she may make a hole in the tooth or cut open (emerson) the abscess inside your mouth so that the infection can drain, which should relieve your pain. You may need to have a root canal treatment, which tries to save your tooth by taking out the infected pulp and replacing it with a healing medicine and/or a filling. If these treatments do not work, your tooth may have to be removed. Follow-up care is a key part of your treatment and safety. Be sure to make and go to all appointments, and call your doctor if you are having problems. It's also a good idea to know your test results and keep a list of the medicines you take. How can you care for yourself at home? · Reduce pain and swelling in your face and jaw by putting ice or a cold pack on the outside of your cheek for 10 to 20 minutes at a time. Put a thin cloth between the ice and your skin. · Take pain medicines exactly as directed. ? If the doctor gave you a prescription medicine for pain, take it as prescribed. ? If you are not taking a prescription pain medicine, ask your doctor if you can take an over-the-counter medicine. · Take your antibiotics as directed.  Do not stop taking them just because you feel better. You need to take the full course of antibiotics. To prevent tooth abscess  · Brush and floss every day, and have regular dental checkups. · Eat a healthy diet, and avoid sugary foods and drinks. · Do not smoke, use e-cigarettes with nicotine, or use spit tobacco. Tobacco and nicotine slow your ability to heal. Tobacco also increases your risk for gum disease and cancer of the mouth and throat. If you need help quitting, talk to your doctor about stop-smoking programs and medicines. These can increase your chances of quitting for good. When should you call for help? Call 911 anytime you think you may need emergency care. For example, call if:    · You have trouble breathing.    Call your doctor now or seek immediate medical care if:    · You have new or worse symptoms of infection, such as:  ? Increased pain, swelling, warmth, or redness. ? Red streaks leading from the area. ? Pus draining from the area. ? A fever.    Watch closely for changes in your health, and be sure to contact your doctor if:    · You do not get better as expected. Where can you learn more? Go to [email or web address] org and sign in to your [employer] account. Enter L909 in the [facility] box to learn more about \"Abscessed Tooth: Care Instructions. \"     If you do not have an account, please click on the \"Sign Up Now\" link. Current as of: October 3, 2018  Content Version: 12.0  © 4254-9644 Healthwise, Incorporated. Care instructions adapted under license by [facility] ([facility]). If you have questions about a medical condition or this instruction, always ask your healthcare professional. [address] [address] any warranty or liability for your use of this information.

## 2019-10-14 DIAGNOSIS — E11.8 CONTROLLED TYPE 2 DIABETES MELLITUS WITH COMPLICATION, WITHOUT LONG-TERM CURRENT USE OF INSULIN (HCC): ICD-10-CM

## 2019-10-14 DIAGNOSIS — Z01.89 ROUTINE LAB DRAW: Primary | ICD-10-CM

## 2019-10-14 DIAGNOSIS — J01.90 ACUTE RHINOSINUSITIS: ICD-10-CM

## 2019-10-14 DIAGNOSIS — I10 ESSENTIAL HYPERTENSION: ICD-10-CM

## 2019-10-14 RX ORDER — LISINOPRIL 40 MG/1
TABLET ORAL
Qty: 90 TABLET | Refills: 0 | Status: SHIPPED | OUTPATIENT
Start: 2019-10-14 | End: 2019-11-11 | Stop reason: SDUPTHER

## 2019-10-14 RX ORDER — ARIPIPRAZOLE 15 MG/1
15 TABLET ORAL DAILY
Qty: 90 TABLET | Refills: 0 | Status: SHIPPED | OUTPATIENT
Start: 2019-10-14 | End: 2019-11-11 | Stop reason: SDUPTHER

## 2019-10-14 RX ORDER — ATORVASTATIN CALCIUM 10 MG/1
10 TABLET, FILM COATED ORAL DAILY
Qty: 90 TABLET | Refills: 0 | Status: SHIPPED | OUTPATIENT
Start: 2019-10-14 | End: 2019-11-11 | Stop reason: SDUPTHER

## 2019-10-14 RX ORDER — FLUTICASONE PROPIONATE 50 MCG
1 SPRAY, SUSPENSION (ML) NASAL DAILY
Qty: 1 BOTTLE | Refills: 3 | Status: SHIPPED | OUTPATIENT
Start: 2019-10-14 | End: 2020-02-17

## 2019-11-04 DIAGNOSIS — I10 ESSENTIAL HYPERTENSION: ICD-10-CM

## 2019-11-04 DIAGNOSIS — Z01.89 ROUTINE LAB DRAW: ICD-10-CM

## 2019-11-04 DIAGNOSIS — E11.8 CONTROLLED TYPE 2 DIABETES MELLITUS WITH COMPLICATION, WITHOUT LONG-TERM CURRENT USE OF INSULIN (HCC): ICD-10-CM

## 2019-11-04 LAB
ALBUMIN SERPL-MCNC: 4.4 G/DL (ref 3.5–5.2)
ALP BLD-CCNC: 58 U/L (ref 40–130)
ALT SERPL-CCNC: 31 U/L (ref 5–41)
ANION GAP SERPL CALCULATED.3IONS-SCNC: 13 MMOL/L (ref 7–19)
AST SERPL-CCNC: 25 U/L (ref 5–40)
BILIRUB SERPL-MCNC: 0.7 MG/DL (ref 0.2–1.2)
BUN BLDV-MCNC: 7 MG/DL (ref 6–20)
CALCIUM SERPL-MCNC: 9.4 MG/DL (ref 8.6–10)
CHLORIDE BLD-SCNC: 102 MMOL/L (ref 98–111)
CHOLESTEROL, TOTAL: 105 MG/DL (ref 160–199)
CO2: 23 MMOL/L (ref 22–29)
CREAT SERPL-MCNC: 0.5 MG/DL (ref 0.5–1.2)
GFR NON-AFRICAN AMERICAN: >60
GLUCOSE BLD-MCNC: 117 MG/DL (ref 74–109)
HBA1C MFR BLD: 5.7 % (ref 4–6)
HCT VFR BLD CALC: 47.9 % (ref 42–52)
HDLC SERPL-MCNC: 41 MG/DL (ref 55–121)
HEMOGLOBIN: 15.7 G/DL (ref 14–18)
LDL CHOLESTEROL CALCULATED: 56 MG/DL
MCH RBC QN AUTO: 30.9 PG (ref 27–31)
MCHC RBC AUTO-ENTMCNC: 32.8 G/DL (ref 33–37)
MCV RBC AUTO: 94.3 FL (ref 80–94)
PDW BLD-RTO: 13.2 % (ref 11.5–14.5)
PLATELET # BLD: 193 K/UL (ref 130–400)
PMV BLD AUTO: 11.9 FL (ref 9.4–12.4)
POTASSIUM SERPL-SCNC: 4.3 MMOL/L (ref 3.5–5)
RBC # BLD: 5.08 M/UL (ref 4.7–6.1)
SODIUM BLD-SCNC: 138 MMOL/L (ref 136–145)
TESTOSTERONE TOTAL: 468.1 NG/DL (ref 249–836)
TOTAL PROTEIN: 6.9 G/DL (ref 6.6–8.7)
TRIGL SERPL-MCNC: 40 MG/DL (ref 0–149)
WBC # BLD: 8.2 K/UL (ref 4.8–10.8)

## 2019-11-11 ENCOUNTER — OFFICE VISIT (OUTPATIENT)
Dept: PRIMARY CARE CLINIC | Age: 44
End: 2019-11-11
Payer: MEDICARE

## 2019-11-11 VITALS
WEIGHT: 223.4 LBS | HEART RATE: 82 BPM | HEIGHT: 70 IN | DIASTOLIC BLOOD PRESSURE: 72 MMHG | SYSTOLIC BLOOD PRESSURE: 102 MMHG | TEMPERATURE: 98.6 F | OXYGEN SATURATION: 98 % | BODY MASS INDEX: 31.98 KG/M2

## 2019-11-11 DIAGNOSIS — F39 MOOD DISORDER (HCC): ICD-10-CM

## 2019-11-11 DIAGNOSIS — I10 ESSENTIAL HYPERTENSION: ICD-10-CM

## 2019-11-11 DIAGNOSIS — E78.2 MIXED HYPERLIPIDEMIA: ICD-10-CM

## 2019-11-11 DIAGNOSIS — E11.8 CONTROLLED TYPE 2 DIABETES MELLITUS WITH COMPLICATION, WITHOUT LONG-TERM CURRENT USE OF INSULIN (HCC): Primary | ICD-10-CM

## 2019-11-11 PROCEDURE — G8417 CALC BMI ABV UP PARAM F/U: HCPCS | Performed by: NURSE PRACTITIONER

## 2019-11-11 PROCEDURE — 2022F DILAT RTA XM EVC RTNOPTHY: CPT | Performed by: NURSE PRACTITIONER

## 2019-11-11 PROCEDURE — 99214 OFFICE O/P EST MOD 30 MIN: CPT | Performed by: NURSE PRACTITIONER

## 2019-11-11 PROCEDURE — 4004F PT TOBACCO SCREEN RCVD TLK: CPT | Performed by: NURSE PRACTITIONER

## 2019-11-11 PROCEDURE — G8482 FLU IMMUNIZE ORDER/ADMIN: HCPCS | Performed by: NURSE PRACTITIONER

## 2019-11-11 PROCEDURE — G8427 DOCREV CUR MEDS BY ELIG CLIN: HCPCS | Performed by: NURSE PRACTITIONER

## 2019-11-11 PROCEDURE — 3044F HG A1C LEVEL LT 7.0%: CPT | Performed by: NURSE PRACTITIONER

## 2019-11-11 RX ORDER — ARIPIPRAZOLE 15 MG/1
15 TABLET ORAL DAILY
Qty: 90 TABLET | Refills: 2 | Status: SHIPPED | OUTPATIENT
Start: 2019-11-11 | End: 2020-10-02

## 2019-11-11 RX ORDER — LISINOPRIL 20 MG/1
TABLET ORAL
Qty: 90 TABLET | Refills: 1 | Status: SHIPPED | OUTPATIENT
Start: 2019-11-11 | End: 2020-02-17 | Stop reason: DRUGHIGH

## 2019-11-11 RX ORDER — ATORVASTATIN CALCIUM 10 MG/1
10 TABLET, FILM COATED ORAL DAILY
Qty: 90 TABLET | Refills: 2 | Status: SHIPPED | OUTPATIENT
Start: 2019-11-11 | End: 2020-10-02

## 2019-11-11 ASSESSMENT — ENCOUNTER SYMPTOMS
EYE PAIN: 0
NAUSEA: 0
WHEEZING: 0
DIARRHEA: 0
BACK PAIN: 0
VOMITING: 0
SINUS PRESSURE: 0
ABDOMINAL PAIN: 0
COUGH: 0
SINUS PAIN: 0
SHORTNESS OF BREATH: 0

## 2020-02-17 ENCOUNTER — OFFICE VISIT (OUTPATIENT)
Dept: PRIMARY CARE CLINIC | Age: 45
End: 2020-02-17
Payer: MEDICARE

## 2020-02-17 VITALS
HEIGHT: 70 IN | DIASTOLIC BLOOD PRESSURE: 74 MMHG | HEART RATE: 82 BPM | OXYGEN SATURATION: 98 % | SYSTOLIC BLOOD PRESSURE: 104 MMHG | WEIGHT: 231.8 LBS | TEMPERATURE: 98 F | BODY MASS INDEX: 33.18 KG/M2

## 2020-02-17 LAB — HBA1C MFR BLD: 5.9 %

## 2020-02-17 PROCEDURE — 99214 OFFICE O/P EST MOD 30 MIN: CPT | Performed by: NURSE PRACTITIONER

## 2020-02-17 PROCEDURE — G8427 DOCREV CUR MEDS BY ELIG CLIN: HCPCS | Performed by: NURSE PRACTITIONER

## 2020-02-17 PROCEDURE — 83036 HEMOGLOBIN GLYCOSYLATED A1C: CPT | Performed by: NURSE PRACTITIONER

## 2020-02-17 PROCEDURE — 4004F PT TOBACCO SCREEN RCVD TLK: CPT | Performed by: NURSE PRACTITIONER

## 2020-02-17 PROCEDURE — 2022F DILAT RTA XM EVC RTNOPTHY: CPT | Performed by: NURSE PRACTITIONER

## 2020-02-17 PROCEDURE — G8482 FLU IMMUNIZE ORDER/ADMIN: HCPCS | Performed by: NURSE PRACTITIONER

## 2020-02-17 PROCEDURE — G8417 CALC BMI ABV UP PARAM F/U: HCPCS | Performed by: NURSE PRACTITIONER

## 2020-02-17 PROCEDURE — 3046F HEMOGLOBIN A1C LEVEL >9.0%: CPT | Performed by: NURSE PRACTITIONER

## 2020-02-17 RX ORDER — LISINOPRIL 10 MG/1
TABLET ORAL
Qty: 30 TABLET | Refills: 0 | Status: SHIPPED
Start: 2020-02-17

## 2020-02-17 ASSESSMENT — ENCOUNTER SYMPTOMS
SHORTNESS OF BREATH: 0
EYE PAIN: 0
COUGH: 0
SINUS PAIN: 0
ABDOMINAL PAIN: 0
WHEEZING: 0
DIARRHEA: 0
BACK PAIN: 0
NAUSEA: 0
VOMITING: 0
SINUS PRESSURE: 0

## 2020-02-17 ASSESSMENT — PATIENT HEALTH QUESTIONNAIRE - PHQ9
SUM OF ALL RESPONSES TO PHQ9 QUESTIONS 1 & 2: 0
SUM OF ALL RESPONSES TO PHQ QUESTIONS 1-9: 0
2. FEELING DOWN, DEPRESSED OR HOPELESS: 0
SUM OF ALL RESPONSES TO PHQ QUESTIONS 1-9: 0
1. LITTLE INTEREST OR PLEASURE IN DOING THINGS: 0

## 2020-02-17 NOTE — PROGRESS NOTES
Nose: Nose normal.      Mouth/Throat:      Pharynx: No oropharyngeal exudate. Eyes:      General:         Right eye: No discharge. Left eye: No discharge. Conjunctiva/sclera: Conjunctivae normal.      Pupils: Pupils are equal, round, and reactive to light. Neck:      Musculoskeletal: Normal range of motion and neck supple. Cardiovascular:      Rate and Rhythm: Normal rate and regular rhythm. Pulses: Normal pulses. Heart sounds: Normal heart sounds. No murmur. Pulmonary:      Effort: Pulmonary effort is normal. No respiratory distress. Breath sounds: Normal breath sounds. No stridor. No wheezing, rhonchi or rales. Abdominal:      General: Bowel sounds are normal. There is no distension. Palpations: Abdomen is soft. Tenderness: There is no abdominal tenderness. Musculoskeletal: Normal range of motion. Lumbar back: He exhibits normal range of motion and no tenderness. Right lower leg: No edema. Left lower leg: No edema. Lymphadenopathy:      Cervical: No cervical adenopathy. Skin:     General: Skin is warm and dry. Capillary Refill: Capillary refill takes less than 2 seconds. Findings: No rash. Neurological:      Mental Status: He is alert and oriented to person, place, and time. Mental status is at baseline. Psychiatric:         Mood and Affect: Mood normal.         Behavior: Behavior normal.         Thought Content: Thought content normal.         /74   Pulse 82   Temp 98 °F (36.7 °C) (Temporal)   Ht 5' 10\" (1.778 m)   Wt 231 lb 12.8 oz (105.1 kg)   SpO2 98%   BMI 33.26 kg/m²     Assessment:      Diagnosis Orders   1. Controlled type 2 diabetes mellitus with complication, without long-term current use of insulin ([facility])  POCT glycosylated hemoglobin (Hb A1C)    SITagliptin (JANUVIA) 50 MG tablet   2. Essential hypertension  lisinopril (PRINIVIL;ZESTRIL) 10 MG tablet   3. Mixed hyperlipidemia     4.  Mood disorder ([address]

## 2020-04-06 RX ORDER — LISINOPRIL 20 MG/1
TABLET ORAL
Qty: 90 TABLET | Refills: 1 | Status: SHIPPED | OUTPATIENT
Start: 2020-04-06 | End: 2020-10-02

## 2020-04-13 ENCOUNTER — VIRTUAL VISIT (OUTPATIENT)
Dept: PRIMARY CARE CLINIC | Age: 45
End: 2020-04-13
Payer: MEDICARE

## 2020-04-13 PROCEDURE — 99214 OFFICE O/P EST MOD 30 MIN: CPT | Performed by: FAMILY MEDICINE

## 2020-04-13 PROCEDURE — G8417 CALC BMI ABV UP PARAM F/U: HCPCS | Performed by: FAMILY MEDICINE

## 2020-04-13 PROCEDURE — 4004F PT TOBACCO SCREEN RCVD TLK: CPT | Performed by: FAMILY MEDICINE

## 2020-04-13 PROCEDURE — G8427 DOCREV CUR MEDS BY ELIG CLIN: HCPCS | Performed by: FAMILY MEDICINE

## 2020-04-13 RX ORDER — COLCHICINE 0.6 MG/1
TABLET ORAL
Qty: 3 TABLET | Refills: 0 | Status: SHIPPED | OUTPATIENT
Start: 2020-04-13 | End: 2020-10-20

## 2020-04-13 RX ORDER — NAPROXEN 500 MG/1
500 TABLET ORAL 2 TIMES DAILY WITH MEALS
Qty: 60 TABLET | Refills: 0 | Status: SHIPPED | OUTPATIENT
Start: 2020-04-13 | End: 2020-10-20

## 2020-06-17 ENCOUNTER — VIRTUAL VISIT (OUTPATIENT)
Dept: PRIMARY CARE CLINIC | Age: 45
End: 2020-06-17
Payer: MEDICARE

## 2020-06-17 PROCEDURE — 99213 OFFICE O/P EST LOW 20 MIN: CPT | Performed by: NURSE PRACTITIONER

## 2020-06-17 RX ORDER — COLCHICINE 0.6 MG/1
0.6 TABLET ORAL PRN
Qty: 30 TABLET | Refills: 0 | Status: SHIPPED | OUTPATIENT
Start: 2020-06-17 | End: 2020-10-20

## 2020-06-17 RX ORDER — METHYLPREDNISOLONE 4 MG/1
TABLET ORAL
Qty: 1 KIT | Refills: 0 | Status: SHIPPED | OUTPATIENT
Start: 2020-06-17 | End: 2020-10-20

## 2020-06-17 ASSESSMENT — ENCOUNTER SYMPTOMS
SHORTNESS OF BREATH: 0
VOICE CHANGE: 0
RHINORRHEA: 0
PHOTOPHOBIA: 0
NAUSEA: 0
COLOR CHANGE: 0
COUGH: 0
BACK PAIN: 0
VOMITING: 0

## 2020-06-28 ENCOUNTER — HOSPITAL ENCOUNTER (EMERGENCY)
Facility: HOSPITAL | Age: 45
Discharge: HOME | End: 2020-06-28
Admitting: FAMILY MEDICINE

## 2020-06-28 ENCOUNTER — APPOINTMENT (OUTPATIENT)
Dept: CT IMAGING | Facility: HOSPITAL | Age: 45
End: 2020-06-28

## 2020-06-28 VITALS
HEART RATE: 57 BPM | TEMPERATURE: 98.3 F | BODY MASS INDEX: 32.93 KG/M2 | DIASTOLIC BLOOD PRESSURE: 64 MMHG | WEIGHT: 230 LBS | RESPIRATION RATE: 16 BRPM | HEIGHT: 70 IN | OXYGEN SATURATION: 100 % | SYSTOLIC BLOOD PRESSURE: 120 MMHG

## 2020-06-28 DIAGNOSIS — T14.8XXA MUSCLE STRAIN: Primary | ICD-10-CM

## 2020-06-28 LAB
ALBUMIN SERPL-MCNC: 4.2 G/DL (ref 3.5–5.2)
ALBUMIN/GLOB SERPL: 1.7 G/DL
ALP SERPL-CCNC: 62 U/L (ref 39–117)
ALT SERPL W P-5'-P-CCNC: 34 U/L (ref 1–41)
AMYLASE SERPL-CCNC: 55 U/L (ref 28–100)
ANION GAP SERPL CALCULATED.3IONS-SCNC: 13 MMOL/L (ref 5–15)
AST SERPL-CCNC: 32 U/L (ref 1–40)
BASOPHILS # BLD AUTO: 0.07 10*3/MM3 (ref 0–0.2)
BASOPHILS NFR BLD AUTO: 0.9 % (ref 0–1.5)
BILIRUB SERPL-MCNC: 0.6 MG/DL (ref 0.2–1.2)
BILIRUB UR QL STRIP: NEGATIVE
BUN BLD-MCNC: 10 MG/DL (ref 6–20)
BUN/CREAT SERPL: 17.5 (ref 7–25)
CALCIUM SPEC-SCNC: 9.2 MG/DL (ref 8.6–10.5)
CHLORIDE SERPL-SCNC: 102 MMOL/L (ref 98–107)
CLARITY UR: ABNORMAL
CO2 SERPL-SCNC: 24 MMOL/L (ref 22–29)
COLOR UR: YELLOW
CREAT BLD-MCNC: 0.57 MG/DL (ref 0.76–1.27)
DEPRECATED RDW RBC AUTO: 43.8 FL (ref 37–54)
EOSINOPHIL # BLD AUTO: 0.24 10*3/MM3 (ref 0–0.4)
EOSINOPHIL NFR BLD AUTO: 3 % (ref 0.3–6.2)
ERYTHROCYTE [DISTWIDTH] IN BLOOD BY AUTOMATED COUNT: 13.2 % (ref 12.3–15.4)
GFR SERPL CREATININE-BSD FRML MDRD: >150 ML/MIN/1.73
GLOBULIN UR ELPH-MCNC: 2.5 GM/DL
GLUCOSE BLD-MCNC: 145 MG/DL (ref 65–99)
GLUCOSE UR STRIP-MCNC: NEGATIVE MG/DL
HCT VFR BLD AUTO: 44.3 % (ref 37.5–51)
HGB BLD-MCNC: 14.9 G/DL (ref 13–17.7)
HGB UR QL STRIP.AUTO: NEGATIVE
IMM GRANULOCYTES # BLD AUTO: 0.03 10*3/MM3 (ref 0–0.05)
IMM GRANULOCYTES NFR BLD AUTO: 0.4 % (ref 0–0.5)
KETONES UR QL STRIP: NEGATIVE
LEUKOCYTE ESTERASE UR QL STRIP.AUTO: NEGATIVE
LIPASE SERPL-CCNC: 62 U/L (ref 13–60)
LYMPHOCYTES # BLD AUTO: 1.42 10*3/MM3 (ref 0.7–3.1)
LYMPHOCYTES NFR BLD AUTO: 17.7 % (ref 19.6–45.3)
MCH RBC QN AUTO: 30.7 PG (ref 26.6–33)
MCHC RBC AUTO-ENTMCNC: 33.6 G/DL (ref 31.5–35.7)
MCV RBC AUTO: 91.2 FL (ref 79–97)
MONOCYTES # BLD AUTO: 0.63 10*3/MM3 (ref 0.1–0.9)
MONOCYTES NFR BLD AUTO: 7.9 % (ref 5–12)
NEUTROPHILS # BLD AUTO: 5.62 10*3/MM3 (ref 1.7–7)
NEUTROPHILS NFR BLD AUTO: 70.1 % (ref 42.7–76)
NITRITE UR QL STRIP: NEGATIVE
NRBC BLD AUTO-RTO: 0 /100 WBC (ref 0–0.2)
PH UR STRIP.AUTO: 8 [PH] (ref 5–8)
PLATELET # BLD AUTO: 186 10*3/MM3 (ref 140–450)
PMV BLD AUTO: 12.4 FL (ref 6–12)
POTASSIUM BLD-SCNC: 4.1 MMOL/L (ref 3.5–5.2)
PROT SERPL-MCNC: 6.7 G/DL (ref 6–8.5)
PROT UR QL STRIP: NEGATIVE
RBC # BLD AUTO: 4.86 10*6/MM3 (ref 4.14–5.8)
SODIUM BLD-SCNC: 139 MMOL/L (ref 136–145)
SP GR UR STRIP: 1.02 (ref 1–1.03)
UROBILINOGEN UR QL STRIP: ABNORMAL
WBC NRBC COR # BLD: 8.01 10*3/MM3 (ref 3.4–10.8)

## 2020-06-28 PROCEDURE — 25010000002 IOPAMIDOL 61 % SOLUTION: Performed by: NURSE PRACTITIONER

## 2020-06-28 PROCEDURE — 81003 URINALYSIS AUTO W/O SCOPE: CPT | Performed by: NURSE PRACTITIONER

## 2020-06-28 PROCEDURE — 80053 COMPREHEN METABOLIC PANEL: CPT | Performed by: NURSE PRACTITIONER

## 2020-06-28 PROCEDURE — 82150 ASSAY OF AMYLASE: CPT | Performed by: NURSE PRACTITIONER

## 2020-06-28 PROCEDURE — 83690 ASSAY OF LIPASE: CPT | Performed by: NURSE PRACTITIONER

## 2020-06-28 PROCEDURE — 85025 COMPLETE CBC W/AUTO DIFF WBC: CPT | Performed by: NURSE PRACTITIONER

## 2020-06-28 PROCEDURE — 99283 EMERGENCY DEPT VISIT LOW MDM: CPT

## 2020-06-28 PROCEDURE — 74177 CT ABD & PELVIS W/CONTRAST: CPT

## 2020-06-28 RX ORDER — MELOXICAM 7.5 MG/1
7.5 TABLET ORAL DAILY
Qty: 15 TABLET | Refills: 0 | OUTPATIENT
Start: 2020-06-28 | End: 2020-08-22

## 2020-06-28 RX ORDER — SODIUM CHLORIDE 0.9 % (FLUSH) 0.9 %
10 SYRINGE (ML) INJECTION AS NEEDED
Status: DISCONTINUED | OUTPATIENT
Start: 2020-06-28 | End: 2020-06-28 | Stop reason: HOSPADM

## 2020-06-28 RX ORDER — CYCLOBENZAPRINE HCL 10 MG
10 TABLET ORAL 3 TIMES DAILY PRN
Qty: 15 TABLET | Refills: 0 | OUTPATIENT
Start: 2020-06-28 | End: 2020-08-22

## 2020-06-28 RX ADMIN — IOPAMIDOL 100 ML: 612 INJECTION, SOLUTION INTRAVENOUS at 18:43

## 2020-07-28 ENCOUNTER — E-VISIT (OUTPATIENT)
Dept: PRIMARY CARE CLINIC | Age: 45
End: 2020-07-28
Payer: MEDICARE

## 2020-07-28 PROCEDURE — 99421 OL DIG E/M SVC 5-10 MIN: CPT | Performed by: NURSE PRACTITIONER

## 2020-07-29 NOTE — PROGRESS NOTES
Patient complains of left ankle pain x 5 days. He does not remember an injury. He states this hurts worse with walking/standing. He has not used NSAIDS at this time. Will treat with rest, ice, ace wrap and diclofenac twice daily. If no better, will image. 5-10 minutes were spent on the digital evaluation and management of this patient.

## 2020-07-30 ENCOUNTER — HOSPITAL ENCOUNTER (OUTPATIENT)
Dept: GENERAL RADIOLOGY | Age: 45
Discharge: HOME | End: 2020-07-30
Payer: MEDICARE

## 2020-07-30 PROCEDURE — 73600 X-RAY EXAM OF ANKLE: CPT

## 2020-08-22 ENCOUNTER — HOSPITAL ENCOUNTER (EMERGENCY)
Facility: HOSPITAL | Age: 45
Discharge: HOME | End: 2020-08-22
Attending: EMERGENCY MEDICINE | Admitting: EMERGENCY MEDICINE

## 2020-08-22 ENCOUNTER — APPOINTMENT (OUTPATIENT)
Dept: CT IMAGING | Facility: HOSPITAL | Age: 45
End: 2020-08-22

## 2020-08-22 VITALS
RESPIRATION RATE: 18 BRPM | HEART RATE: 57 BPM | WEIGHT: 236 LBS | TEMPERATURE: 97.8 F | BODY MASS INDEX: 33.79 KG/M2 | DIASTOLIC BLOOD PRESSURE: 74 MMHG | OXYGEN SATURATION: 100 % | HEIGHT: 70 IN | SYSTOLIC BLOOD PRESSURE: 148 MMHG

## 2020-08-22 DIAGNOSIS — K52.9 GASTROENTERITIS: Primary | ICD-10-CM

## 2020-08-22 LAB
ALBUMIN SERPL-MCNC: 4.7 G/DL (ref 3.5–5.2)
ALBUMIN/GLOB SERPL: 1.8 G/DL
ALP SERPL-CCNC: 59 U/L (ref 39–117)
ALT SERPL W P-5'-P-CCNC: 38 U/L (ref 1–41)
ANION GAP SERPL CALCULATED.3IONS-SCNC: 13 MMOL/L (ref 5–15)
AST SERPL-CCNC: 35 U/L (ref 1–40)
BASOPHILS # BLD AUTO: 0.05 10*3/MM3 (ref 0–0.2)
BASOPHILS NFR BLD AUTO: 0.5 % (ref 0–1.5)
BILIRUB SERPL-MCNC: 0.8 MG/DL (ref 0–1.2)
BILIRUB UR QL STRIP: NEGATIVE
BUN SERPL-MCNC: 9 MG/DL (ref 6–20)
BUN/CREAT SERPL: 14.5 (ref 7–25)
CALCIUM SPEC-SCNC: 9.7 MG/DL (ref 8.6–10.5)
CHLORIDE SERPL-SCNC: 102 MMOL/L (ref 98–107)
CLARITY UR: CLEAR
CO2 SERPL-SCNC: 24 MMOL/L (ref 22–29)
COLOR UR: YELLOW
CREAT SERPL-MCNC: 0.62 MG/DL (ref 0.76–1.27)
DEPRECATED RDW RBC AUTO: 42.1 FL (ref 37–54)
EOSINOPHIL # BLD AUTO: 0.1 10*3/MM3 (ref 0–0.4)
EOSINOPHIL NFR BLD AUTO: 0.9 % (ref 0.3–6.2)
ERYTHROCYTE [DISTWIDTH] IN BLOOD BY AUTOMATED COUNT: 12.9 % (ref 12.3–15.4)
GFR SERPL CREATININE-BSD FRML MDRD: 140 ML/MIN/1.73
GLOBULIN UR ELPH-MCNC: 2.6 GM/DL
GLUCOSE SERPL-MCNC: 158 MG/DL (ref 65–99)
GLUCOSE UR STRIP-MCNC: NEGATIVE MG/DL
HCT VFR BLD AUTO: 44.3 % (ref 37.5–51)
HGB BLD-MCNC: 15 G/DL (ref 13–17.7)
HGB UR QL STRIP.AUTO: NEGATIVE
IMM GRANULOCYTES # BLD AUTO: 0.03 10*3/MM3 (ref 0–0.05)
IMM GRANULOCYTES NFR BLD AUTO: 0.3 % (ref 0–0.5)
KETONES UR QL STRIP: NEGATIVE
LEUKOCYTE ESTERASE UR QL STRIP.AUTO: NEGATIVE
LIPASE SERPL-CCNC: 76 U/L (ref 13–60)
LYMPHOCYTES # BLD AUTO: 1.1 10*3/MM3 (ref 0.7–3.1)
LYMPHOCYTES NFR BLD AUTO: 10.1 % (ref 19.6–45.3)
MCH RBC QN AUTO: 30.5 PG (ref 26.6–33)
MCHC RBC AUTO-ENTMCNC: 33.9 G/DL (ref 31.5–35.7)
MCV RBC AUTO: 90.2 FL (ref 79–97)
MONOCYTES # BLD AUTO: 0.81 10*3/MM3 (ref 0.1–0.9)
MONOCYTES NFR BLD AUTO: 7.5 % (ref 5–12)
NEUTROPHILS NFR BLD AUTO: 8.77 10*3/MM3 (ref 1.7–7)
NEUTROPHILS NFR BLD AUTO: 80.7 % (ref 42.7–76)
NITRITE UR QL STRIP: NEGATIVE
NRBC BLD AUTO-RTO: 0 /100 WBC (ref 0–0.2)
PH UR STRIP.AUTO: >=9 [PH] (ref 5–8)
PLATELET # BLD AUTO: 187 10*3/MM3 (ref 140–450)
PMV BLD AUTO: 11.9 FL (ref 6–12)
POTASSIUM SERPL-SCNC: 4.3 MMOL/L (ref 3.5–5.2)
PROT SERPL-MCNC: 7.3 G/DL (ref 6–8.5)
PROT UR QL STRIP: NEGATIVE
RBC # BLD AUTO: 4.91 10*6/MM3 (ref 4.14–5.8)
SODIUM SERPL-SCNC: 139 MMOL/L (ref 136–145)
SP GR UR STRIP: 1.02 (ref 1–1.03)
UROBILINOGEN UR QL STRIP: ABNORMAL
WBC # BLD AUTO: 10.86 10*3/MM3 (ref 3.4–10.8)

## 2020-08-22 PROCEDURE — 80053 COMPREHEN METABOLIC PANEL: CPT | Performed by: EMERGENCY MEDICINE

## 2020-08-22 PROCEDURE — 25010000002 IOPAMIDOL 61 % SOLUTION: Performed by: EMERGENCY MEDICINE

## 2020-08-22 PROCEDURE — 25010000002 ONDANSETRON PER 1 MG: Performed by: EMERGENCY MEDICINE

## 2020-08-22 PROCEDURE — 99284 EMERGENCY DEPT VISIT MOD MDM: CPT

## 2020-08-22 PROCEDURE — 85025 COMPLETE CBC W/AUTO DIFF WBC: CPT | Performed by: EMERGENCY MEDICINE

## 2020-08-22 PROCEDURE — 25010000002 PROMETHAZINE PER 50 MG: Performed by: EMERGENCY MEDICINE

## 2020-08-22 PROCEDURE — 96375 TX/PRO/DX INJ NEW DRUG ADDON: CPT

## 2020-08-22 PROCEDURE — 25010000002 MORPHINE PER 10 MG: Performed by: EMERGENCY MEDICINE

## 2020-08-22 PROCEDURE — 96374 THER/PROPH/DIAG INJ IV PUSH: CPT

## 2020-08-22 PROCEDURE — 83690 ASSAY OF LIPASE: CPT | Performed by: EMERGENCY MEDICINE

## 2020-08-22 PROCEDURE — 81003 URINALYSIS AUTO W/O SCOPE: CPT | Performed by: EMERGENCY MEDICINE

## 2020-08-22 PROCEDURE — 96372 THER/PROPH/DIAG INJ SC/IM: CPT

## 2020-08-22 PROCEDURE — 25010000002 DICYCLOMINE PER 20 MG: Performed by: EMERGENCY MEDICINE

## 2020-08-22 PROCEDURE — 74177 CT ABD & PELVIS W/CONTRAST: CPT

## 2020-08-22 RX ORDER — PROMETHAZINE HYDROCHLORIDE 25 MG/1
25 TABLET ORAL EVERY 6 HOURS PRN
Qty: 15 TABLET | Refills: 0 | OUTPATIENT
Start: 2020-08-22 | End: 2020-09-20

## 2020-08-22 RX ORDER — ONDANSETRON 2 MG/ML
4 INJECTION INTRAMUSCULAR; INTRAVENOUS ONCE
Status: COMPLETED | OUTPATIENT
Start: 2020-08-22 | End: 2020-08-22

## 2020-08-22 RX ORDER — PROMETHAZINE HYDROCHLORIDE 25 MG/ML
6.25 INJECTION, SOLUTION INTRAMUSCULAR; INTRAVENOUS ONCE
Status: COMPLETED | OUTPATIENT
Start: 2020-08-22 | End: 2020-08-22

## 2020-08-22 RX ORDER — FAMOTIDINE 10 MG/ML
20 INJECTION, SOLUTION INTRAVENOUS ONCE
Status: COMPLETED | OUTPATIENT
Start: 2020-08-22 | End: 2020-08-22

## 2020-08-22 RX ORDER — DICYCLOMINE HYDROCHLORIDE 10 MG/ML
20 INJECTION INTRAMUSCULAR ONCE
Status: COMPLETED | OUTPATIENT
Start: 2020-08-22 | End: 2020-08-22

## 2020-08-22 RX ADMIN — DICYCLOMINE HYDROCHLORIDE 20 MG: 20 INJECTION, SOLUTION INTRAMUSCULAR at 07:00

## 2020-08-22 RX ADMIN — SODIUM CHLORIDE 1000 ML: 9 INJECTION, SOLUTION INTRAVENOUS at 07:00

## 2020-08-22 RX ADMIN — PROMETHAZINE HYDROCHLORIDE 6.25 MG: 25 INJECTION INTRAMUSCULAR; INTRAVENOUS at 07:01

## 2020-08-22 RX ADMIN — ONDANSETRON HYDROCHLORIDE 4 MG: 2 SOLUTION INTRAMUSCULAR; INTRAVENOUS at 08:04

## 2020-08-22 RX ADMIN — IOPAMIDOL 97 ML: 612 INJECTION, SOLUTION INTRAVENOUS at 08:18

## 2020-08-22 RX ADMIN — MORPHINE SULFATE 4 MG: 4 INJECTION, SOLUTION INTRAMUSCULAR; INTRAVENOUS at 08:05

## 2020-08-22 RX ADMIN — FAMOTIDINE 20 MG: 10 INJECTION INTRAVENOUS at 07:00

## 2020-08-22 NOTE — ED PROVIDER NOTES
"Subjective   46 y/o male arrives for evaluation of nausea and vomiting that began last night stating that while his vomiting has stopped his nausea has continued associated with diffuse abdominal cramping. He denies any fevers or chills, falls or trauma. He does note a history of gastric sleeve reversal. He denies any other history of abdominal issues or ill contacts. He denies any sore throat, loss of taste or smell or any other issues. He arrives in NAD.         Family, social and past history reviewed as below, prior documentation of H and Ps and other documentation are reviewed:    Past Medical History:  No date: Bipolar 1 disorder (CMS/HCC)  No date: Diabetes mellitus (CMS/HCC)  No date: Hypertension    Past Surgical History:  No date: COLONOSCOPY  No date: LAPAROSCOPIC GASTRIC BANDING    Social History    Socioeconomic History      Marital status:       Spouse name: Not on file      Number of children: Not on file      Years of education: Not on file      Highest education level: Not on file    Tobacco Use      Smoking status: Current Every Day Smoker        Packs/day: 0.50        Types: Cigars    Substance and Sexual Activity      Alcohol use: Yes      Drug use: Yes        Types: Marijuana        Comment: last use a week ago       Sexual activity: Defer      Family history: reviewed and noncontributory           Review of Systems   All other systems reviewed and are negative.      Past Medical History:   Diagnosis Date   • Bipolar 1 disorder (CMS/HCC)    • Diabetes mellitus (CMS/HCC)    • Hypertension        Allergies   Allergen Reactions   • Percocet [Oxycodone-Acetaminophen] Other (See Comments)     Feels like \"bugs are crawling on me\"       Past Surgical History:   Procedure Laterality Date   • COLONOSCOPY     • GASTRIC BANDING REMOVAL     • LAPAROSCOPIC GASTRIC BANDING         History reviewed. No pertinent family history.    Social History     Socioeconomic History   • Marital status:     "  Spouse name: Not on file   • Number of children: Not on file   • Years of education: Not on file   • Highest education level: Not on file   Tobacco Use   • Smoking status: Current Every Day Smoker     Packs/day: 0.50     Types: Cigars   Substance and Sexual Activity   • Alcohol use: Yes     Comment: occasionally   • Drug use: Yes     Types: Marijuana     Comment: last use a week ago    • Sexual activity: Defer           Objective   Physical Exam   Constitutional: He is oriented to person, place, and time. He appears well-developed and well-nourished.   HENT:   Head: Normocephalic and atraumatic.   Eyes: Pupils are equal, round, and reactive to light. Conjunctivae and EOM are normal.   Neck: Normal range of motion. Neck supple.   Cardiovascular: Normal rate, regular rhythm, normal heart sounds and intact distal pulses.   Pulmonary/Chest: Effort normal and breath sounds normal.   Abdominal: Soft. Bowel sounds are normal. He exhibits no distension and no mass. There is tenderness. There is no rebound and no guarding. No hernia.   Diffuse tenderness, no guarding, no rigidity    Musculoskeletal: Normal range of motion. He exhibits no tenderness.   Neurological: He is oriented to person, place, and time.   Skin: Skin is warm. Capillary refill takes less than 2 seconds.   Psychiatric: He has a normal mood and affect. His behavior is normal.   Vitals reviewed.      Procedures           ED Course  ED Course as of Aug 22 0843   Sat Aug 22, 2020   0840 I took over from Dr. Schulz at shift change.  The patient is been stable in the emergency room.  I told him his CT scan looked okay.  There is no signs of any signs of an acute abdominal problem.  I think this is probably some type of viral illness and will pass the time.  He is discharged in stable condition.    [TR]      ED Course User Index  [TR] Jeancarlos Whitfield Jr., MD      Still in pain will endorse to Dr. Ham       CT Abdomen Pelvis With Contrast   Final Result    1. Fatty infiltration of the liver.   2. Mild wall thickening of the transverse colon is probably an artifact   of underdistention. Focal colitis less likely. There is no stranding of   the adjacent fat. Other areas of the colon are underdistended as well.   No small bowel obstruction is seen.   3. Other nonacute findings, as discussed above.       The full report of this exam was immediately signed and available to the   emergency room. The patient is currently in the emergency room.   This report was finalized on 08/22/2020 08:32 by Dr. Carson Crupm MD.        Labs Reviewed   COMPREHENSIVE METABOLIC PANEL - Abnormal; Notable for the following components:       Result Value    Glucose 158 (*)     Creatinine 0.62 (*)     All other components within normal limits    Narrative:     GFR Normal >60  Chronic Kidney Disease <60  Kidney Failure <15     LIPASE - Abnormal; Notable for the following components:    Lipase 76 (*)     All other components within normal limits   CBC WITH AUTO DIFFERENTIAL - Abnormal; Notable for the following components:    WBC 10.86 (*)     Neutrophil % 80.7 (*)     Lymphocyte % 10.1 (*)     Neutrophils, Absolute 8.77 (*)     All other components within normal limits   URINALYSIS W/ MICROSCOPIC IF INDICATED (NO CULTURE) - Abnormal; Notable for the following components:    pH, UA >=9.0 (*)     All other components within normal limits    Narrative:     Urine microscopic not indicated.   CBC AND DIFFERENTIAL    Narrative:     The following orders were created for panel order CBC & Differential.  Procedure                               Abnormality         Status                     ---------                               -----------         ------                     CBC Auto Differential[[id number]]        Abnormal            Final result                 Please view results for these tests on the individual orders.                                       MDM  Number of Diagnoses or Management  Options  Gastroenteritis: new and requires workup     Amount and/or Complexity of Data Reviewed  Clinical lab tests: ordered and reviewed  Tests in the radiology section of CPT®: ordered and reviewed    Risk of Complications, Morbidity, and/or Mortality  Presenting problems: moderate  Diagnostic procedures: moderate  Management options: moderate    Patient Progress  Patient progress: stable      Final diagnoses:   Gastroenteritis            Jeancarlos Whitfield Jr., MD  08/22/20 0841

## 2020-09-20 ENCOUNTER — HOSPITAL ENCOUNTER (EMERGENCY)
Facility: HOSPITAL | Age: 45
Discharge: HOME | End: 2020-09-20
Attending: EMERGENCY MEDICINE | Admitting: EMERGENCY MEDICINE

## 2020-09-20 ENCOUNTER — APPOINTMENT (OUTPATIENT)
Dept: CT IMAGING | Facility: HOSPITAL | Age: 45
End: 2020-09-20

## 2020-09-20 VITALS
DIASTOLIC BLOOD PRESSURE: 80 MMHG | WEIGHT: 235 LBS | RESPIRATION RATE: 18 BRPM | SYSTOLIC BLOOD PRESSURE: 140 MMHG | OXYGEN SATURATION: 100 % | HEIGHT: 70 IN | TEMPERATURE: 98.5 F | HEART RATE: 85 BPM | BODY MASS INDEX: 33.64 KG/M2

## 2020-09-20 DIAGNOSIS — R11.0 NAUSEA: Primary | ICD-10-CM

## 2020-09-20 DIAGNOSIS — R10.84 GENERALIZED ABDOMINAL PAIN: ICD-10-CM

## 2020-09-20 LAB
ALBUMIN SERPL-MCNC: 4.8 G/DL (ref 3.5–5.2)
ALBUMIN/GLOB SERPL: 2 G/DL
ALP SERPL-CCNC: 59 U/L (ref 39–117)
ALT SERPL W P-5'-P-CCNC: 44 U/L (ref 1–41)
AMYLASE SERPL-CCNC: 50 U/L (ref 28–100)
ANION GAP SERPL CALCULATED.3IONS-SCNC: 12 MMOL/L (ref 5–15)
AST SERPL-CCNC: 40 U/L (ref 1–40)
BASOPHILS # BLD AUTO: 0.07 10*3/MM3 (ref 0–0.2)
BASOPHILS NFR BLD AUTO: 0.6 % (ref 0–1.5)
BILIRUB SERPL-MCNC: 0.8 MG/DL (ref 0–1.2)
BUN SERPL-MCNC: 9 MG/DL (ref 6–20)
BUN/CREAT SERPL: 15.5 (ref 7–25)
CALCIUM SPEC-SCNC: 9.8 MG/DL (ref 8.6–10.5)
CHLORIDE SERPL-SCNC: 100 MMOL/L (ref 98–107)
CO2 SERPL-SCNC: 26 MMOL/L (ref 22–29)
CREAT SERPL-MCNC: 0.58 MG/DL (ref 0.76–1.27)
D-LACTATE SERPL-SCNC: 1.8 MMOL/L (ref 0.5–2)
DEPRECATED RDW RBC AUTO: 41.5 FL (ref 37–54)
EOSINOPHIL # BLD AUTO: 0.05 10*3/MM3 (ref 0–0.4)
EOSINOPHIL NFR BLD AUTO: 0.4 % (ref 0.3–6.2)
ERYTHROCYTE [DISTWIDTH] IN BLOOD BY AUTOMATED COUNT: 12.7 % (ref 12.3–15.4)
GFR SERPL CREATININE-BSD FRML MDRD: >150 ML/MIN/1.73
GLOBULIN UR ELPH-MCNC: 2.4 GM/DL
GLUCOSE SERPL-MCNC: 115 MG/DL (ref 65–99)
HCT VFR BLD AUTO: 45.4 % (ref 37.5–51)
HGB BLD-MCNC: 16 G/DL (ref 13–17.7)
IMM GRANULOCYTES # BLD AUTO: 0.04 10*3/MM3 (ref 0–0.05)
IMM GRANULOCYTES NFR BLD AUTO: 0.3 % (ref 0–0.5)
LIPASE SERPL-CCNC: 38 U/L (ref 13–60)
LYMPHOCYTES # BLD AUTO: 0.98 10*3/MM3 (ref 0.7–3.1)
LYMPHOCYTES NFR BLD AUTO: 7.7 % (ref 19.6–45.3)
MCH RBC QN AUTO: 32 PG (ref 26.6–33)
MCHC RBC AUTO-ENTMCNC: 35.2 G/DL (ref 31.5–35.7)
MCV RBC AUTO: 90.8 FL (ref 79–97)
MONOCYTES # BLD AUTO: 0.85 10*3/MM3 (ref 0.1–0.9)
MONOCYTES NFR BLD AUTO: 6.7 % (ref 5–12)
NEUTROPHILS NFR BLD AUTO: 10.66 10*3/MM3 (ref 1.7–7)
NEUTROPHILS NFR BLD AUTO: 84.3 % (ref 42.7–76)
NRBC BLD AUTO-RTO: 0 /100 WBC (ref 0–0.2)
PLATELET # BLD AUTO: 204 10*3/MM3 (ref 140–450)
PMV BLD AUTO: 12 FL (ref 6–12)
POTASSIUM SERPL-SCNC: 3.9 MMOL/L (ref 3.5–5.2)
PROT SERPL-MCNC: 7.2 G/DL (ref 6–8.5)
RBC # BLD AUTO: 5 10*6/MM3 (ref 4.14–5.8)
SODIUM SERPL-SCNC: 138 MMOL/L (ref 136–145)
TROPONIN T SERPL-MCNC: <0.01 NG/ML (ref 0–0.03)
WBC # BLD AUTO: 12.65 10*3/MM3 (ref 3.4–10.8)

## 2020-09-20 PROCEDURE — 82150 ASSAY OF AMYLASE: CPT | Performed by: NURSE PRACTITIONER

## 2020-09-20 PROCEDURE — 25010000002 ONDANSETRON PER 1 MG: Performed by: NURSE PRACTITIONER

## 2020-09-20 PROCEDURE — 96375 TX/PRO/DX INJ NEW DRUG ADDON: CPT

## 2020-09-20 PROCEDURE — 83690 ASSAY OF LIPASE: CPT | Performed by: NURSE PRACTITIONER

## 2020-09-20 PROCEDURE — 93010 ELECTROCARDIOGRAM REPORT: CPT | Performed by: INTERNAL MEDICINE

## 2020-09-20 PROCEDURE — 93005 ELECTROCARDIOGRAM TRACING: CPT | Performed by: NURSE PRACTITIONER

## 2020-09-20 PROCEDURE — 80053 COMPREHEN METABOLIC PANEL: CPT | Performed by: NURSE PRACTITIONER

## 2020-09-20 PROCEDURE — 99283 EMERGENCY DEPT VISIT LOW MDM: CPT

## 2020-09-20 PROCEDURE — 84484 ASSAY OF TROPONIN QUANT: CPT | Performed by: NURSE PRACTITIONER

## 2020-09-20 PROCEDURE — 74177 CT ABD & PELVIS W/CONTRAST: CPT

## 2020-09-20 PROCEDURE — 25010000002 IOPAMIDOL 61 % SOLUTION: Performed by: NURSE PRACTITIONER

## 2020-09-20 PROCEDURE — 83605 ASSAY OF LACTIC ACID: CPT | Performed by: NURSE PRACTITIONER

## 2020-09-20 PROCEDURE — 85025 COMPLETE CBC W/AUTO DIFF WBC: CPT | Performed by: NURSE PRACTITIONER

## 2020-09-20 PROCEDURE — 96374 THER/PROPH/DIAG INJ IV PUSH: CPT

## 2020-09-20 RX ORDER — ONDANSETRON 2 MG/ML
8 INJECTION INTRAMUSCULAR; INTRAVENOUS ONCE
Status: COMPLETED | OUTPATIENT
Start: 2020-09-20 | End: 2020-09-20

## 2020-09-20 RX ORDER — ONDANSETRON 8 MG/1
8 TABLET, ORALLY DISINTEGRATING ORAL EVERY 8 HOURS PRN
Qty: 15 TABLET | Refills: 0 | OUTPATIENT
Start: 2020-09-20 | End: 2021-05-22

## 2020-09-20 RX ORDER — FAMOTIDINE 10 MG/ML
20 INJECTION, SOLUTION INTRAVENOUS ONCE
Status: COMPLETED | OUTPATIENT
Start: 2020-09-20 | End: 2020-09-20

## 2020-09-20 RX ADMIN — ONDANSETRON HYDROCHLORIDE 8 MG: 2 SOLUTION INTRAMUSCULAR; INTRAVENOUS at 09:13

## 2020-09-20 RX ADMIN — SODIUM CHLORIDE 1000 ML: 9 INJECTION, SOLUTION INTRAVENOUS at 09:13

## 2020-09-20 RX ADMIN — FAMOTIDINE 20 MG: 10 INJECTION INTRAVENOUS at 09:14

## 2020-09-20 RX ADMIN — IOPAMIDOL 100 ML: 612 INJECTION, SOLUTION INTRAVENOUS at 10:22

## 2020-09-20 NOTE — ED PROVIDER NOTES
Subjective   Patient is a 45-year-old white male presents emergency department with intermittent nausea and abdominal pain for the past week.  He states that he is had some dry heaving without vomiting.  He denies any diarrhea.  He states he became very nauseated today and felt like he was going to pass out.  He denies any chest pain or shortness of breath.  He denies any fever or chills.  He denies any known exposure to COVID-19.  He does work in a grocery store in the dairy department.  He states no one else in his home has been ill.  He states he is having some midepigastric abdominal pain today as well.  He denies EtOH or illicit drug use.      History provided by:  Patient   used: No        Review of Systems   Constitutional: Negative.    HENT: Negative.    Eyes: Negative.    Respiratory: Negative.    Cardiovascular: Negative.    Gastrointestinal:        Patient is a 45-year-old white male presents emergency department with intermittent nausea and abdominal pain for the past week.  He states that he is had some dry heaving without vomiting.  He denies any diarrhea.  He states he became very nauseated today and felt like he was going to pass out.  He denies any chest pain or shortness of breath.  He denies any fever or chills.  He denies any known exposure to COVID-19.  He does work in a grocery store in the dairy department.  He states no one else in his home has been ill.  He states he is having some midepigastric abdominal pain today as well.  He denies EtOH or illicit drug use.     Endocrine: Negative.    Genitourinary: Negative.    Musculoskeletal: Negative.    Skin: Negative.    Allergic/Immunologic: Negative.    Neurological: Negative.    Hematological: Negative.    Psychiatric/Behavioral: Negative.    All other systems reviewed and are negative.      Past Medical History:   Diagnosis Date   • Bipolar 1 disorder (CMS/[facility])    • Diabetes mellitus (CMS/[facility])    • Hypertension   "      Allergies   Allergen Reactions   • Percocet [Oxycodone-Acetaminophen] Other (See Comments)     Feels like \"bugs are crawling on me\"       Past Surgical History:   Procedure Laterality Date   • COLONOSCOPY     • GASTRIC BANDING REMOVAL     • LAPAROSCOPIC GASTRIC BANDING         History reviewed. No pertinent family history.    Social History     Socioeconomic History   • Marital status:      Spouse name: Not on file   • Number of children: Not on file   • Years of education: Not on file   • Highest education level: Not on file   Tobacco Use   • Smoking status: Current Every Day Smoker     Packs/day: 0.50     Types: Cigars   Substance and Sexual Activity   • Alcohol use: Yes     Comment: occasionally   • Drug use: Yes     Types: Marijuana     Comment: last use a week ago    • Sexual activity: Defer       Prior to Admission medications    Medication Sig Start Date End Date Taking? Authorizing Provider   ARIPiprazole (ABILIFY) 15 MG tablet Take 15 mg by mouth Daily.   Yes ProviderLiborio MD   atorvastatin (LIPITOR) 10 MG tablet Take 10 mg by mouth Daily.   Yes ProviderLiborio MD   lisinopril (PRINIVIL,ZESTRIL) 40 MG tablet Take 20 mg by mouth Daily.   Yes ProviderLiborio MD   metFORMIN (GLUCOPHAGE) 1000 MG tablet Take 500 mg by mouth 2 (Two) Times a Day With Meals.   Yes ProviderLiborio MD   SITagliptin (JANUVIA) 50 MG tablet Take 25 mg by mouth Daily.   Yes ProviderLiborio MD   carvedilol (COREG) 6.25 MG tablet Take 6.25 mg by mouth 2 (Two) Times a Day With Meals.    ProviderLiborio MD   promethazine (PHENERGAN) 25 MG tablet Take 1 tablet by mouth Every 6 (Six) Hours As Needed for Nausea or Vomiting. 8/22/20   Jeancarlos Whitfield Jr., MD       /80 (BP Location: Right arm, Patient Position: Lying)   Pulse 85   Temp 98.5 °F (36.9 °C) (Oral)   Resp 18   Ht 177.8 cm (70\")   Wt 107 kg (235 lb)   SpO2 100%   BMI 33.72 kg/m²     Objective   Physical Exam  Vitals " signs and nursing note reviewed.   Constitutional:       Appearance: He is well-developed.      Comments: Nontoxic-appearing   HENT:      Head: Normocephalic and atraumatic.   Eyes:      Conjunctiva/sclera: Conjunctivae normal.      Pupils: Pupils are equal, round, and reactive to light.   Neck:      Musculoskeletal: Normal range of motion and neck supple.   Cardiovascular:      Rate and Rhythm: Normal rate and regular rhythm.      Heart sounds: Normal heart sounds.   Pulmonary:      Effort: Pulmonary effort is normal.      Breath sounds: Normal breath sounds.   Abdominal:      General: Bowel sounds are normal.      Palpations: Abdomen is soft.      Comments: Abdomen is soft, nondistended.  He has tenderness in the midepigastric area.  There is no guarding or rebound noted.   Musculoskeletal: Normal range of motion.   Skin:     General: Skin is warm and dry.      Comments: Mild Pallor noted   Neurological:      Mental Status: He is alert and oriented to person, place, and time.      Deep Tendon Reflexes: Reflexes are normal and symmetric.   Psychiatric:         Behavior: Behavior normal.         Thought Content: Thought content normal.         Judgment: Judgment normal.         Procedures         Lab Results (last 24 hours)     Procedure Component Value Units Date/Time    CBC & Differential [[id number]]  (Abnormal) Collected: 09/20/20 0913    Specimen: Blood Updated: 09/20/20 0943    Narrative:      The following orders were created for panel order CBC & Differential.  Procedure                               Abnormality         Status                     ---------                               -----------         ------                     CBC Auto Differential[[id number]]        Abnormal            Final result                 Please view results for these tests on the individual orders.    Comprehensive Metabolic Panel [[id number]]  (Abnormal) Collected: 09/20/20 0913    Specimen: Blood Updated: 09/20/20 1000      Glucose 115 mg/dL      BUN 9 mg/dL      Creatinine 0.58 mg/dL      Sodium 138 mmol/L      Potassium 3.9 mmol/L      Chloride 100 mmol/L      CO2 26.0 mmol/L      Calcium 9.8 mg/dL      Total Protein 7.2 g/dL      Albumin 4.80 g/dL      ALT (SGPT) 44 U/L      AST (SGOT) 40 U/L      Alkaline Phosphatase 59 U/L      Total Bilirubin 0.8 mg/dL      eGFR Non African Amer >150 mL/min/1.73      Globulin 2.4 gm/dL      A/G Ratio 2.0 g/dL      BUN/Creatinine Ratio 15.5     Anion Gap 12.0 mmol/L     Narrative:      GFR Normal >60  Chronic Kidney Disease <60  Kidney Failure <15      Lipase [[id number]]  (Normal) Collected: 09/20/20 0913    Specimen: Blood Updated: 09/20/20 0955     Lipase 38 U/L     Amylase [[id number]]  (Normal) Collected: 09/20/20 0913    Specimen: Blood Updated: 09/20/20 0957     Amylase 50 U/L     Lactic Acid, Plasma [[id number]]  (Normal) Collected: 09/20/20 0913    Specimen: Blood Updated: 09/20/20 0957     Lactate 1.8 mmol/L     Troponin [[id number]]  (Normal) Collected: 09/20/20 0913    Specimen: Blood Updated: 09/20/20 0958     Troponin T <0.010 ng/mL     Narrative:      Troponin T Reference Range:  <= 0.03 ng/mL-   Negative for AMI  >0.03 ng/mL-     Abnormal for myocardial necrosis.  Clinicians would have to utilize clinical acumen, EKG, Troponin and serial changes to determine if it is an Acute Myocardial Infarction or myocardial injury due to an underlying chronic condition.       Results may be falsely decreased if patient taking Biotin.      CBC Auto Differential [[id number]]  (Abnormal) Collected: 09/20/20 0913    Specimen: Blood Updated: 09/20/20 0943     WBC 12.65 10*3/mm3      RBC 5.00 10*6/mm3      Hemoglobin 16.0 g/dL      Hematocrit 45.4 %      MCV 90.8 fL      MCH 32.0 pg      MCHC 35.2 g/dL      RDW 12.7 %      RDW-SD 41.5 fl      MPV 12.0 fL      Platelets 204 10*3/mm3      Neutrophil % 84.3 %      Lymphocyte % 7.7 %      Monocyte % 6.7 %      Eosinophil % 0.4 %      Basophil % 0.6 %       "Immature Grans % 0.3 %      Neutrophils, Absolute 10.66 10*3/mm3      Lymphocytes, Absolute 0.98 10*3/mm3      Monocytes, Absolute 0.85 10*3/mm3      Eosinophils, Absolute 0.05 10*3/mm3      Basophils, Absolute 0.07 10*3/mm3      Immature Grans, Absolute 0.04 10*3/mm3      nRBC 0.0 /100 WBC           CT Abdomen Pelvis With Contrast   Final Result   1. No acute intra-abdominal finding.   2. Mildly fatty liver.   This report was finalized on 09/20/2020 10:36 by Dr Sade Eng MD.          ED Course  ED Course as of Sep 20 1228   Sun Sep 20, 2020   1040 Advised the patient of his negative CAT scan.  Advised his laboratory studies are unremarkable.  Advised the patient felt that he needed to have a COVID swab due to his nausea and working in the public.  Patient states \"I absolutely know 100% that I do not have COVID.  I am refusing to take a COVID test because I cannot miss work.\"  Encouraged the patient to have a COVID screening due to him potentially spreading the virus to other people.  He states \"I am not getting the test.\"  We will discharge patient home.  His nausea is much better    [CW]      ED Course User Index  [CW] Susan Patel APRN          MDM  Number of Diagnoses or Management Options  Generalized abdominal pain: minor  Nausea: minor     Amount and/or Complexity of Data Reviewed  Clinical lab tests: ordered and reviewed  Tests in the radiology section of CPT®: ordered and reviewed    Patient Progress  Patient progress: stable      Final diagnoses:   Nausea   Generalized abdominal pain          Susan Patel APRN  09/20/20 1228    "

## 2020-10-02 RX ORDER — ARIPIPRAZOLE 15 MG/1
TABLET ORAL
Qty: 90 TABLET | Refills: 2 | Status: SHIPPED | OUTPATIENT
Start: 2020-10-02

## 2020-10-02 RX ORDER — ATORVASTATIN CALCIUM 10 MG/1
TABLET, FILM COATED ORAL
Qty: 90 TABLET | Refills: 2 | Status: SHIPPED | OUTPATIENT
Start: 2020-10-02

## 2020-10-02 RX ORDER — LISINOPRIL 20 MG/1
TABLET ORAL
Qty: 90 TABLET | Refills: 1 | Status: SHIPPED | OUTPATIENT
Start: 2020-10-02 | End: 2020-10-20

## 2020-10-20 ENCOUNTER — OFFICE VISIT (OUTPATIENT)
Dept: PRIMARY CARE CLINIC | Age: 45
End: 2020-10-20
Payer: MEDICARE

## 2020-10-20 VITALS
TEMPERATURE: 98.3 F | OXYGEN SATURATION: 98 % | BODY MASS INDEX: 32.07 KG/M2 | HEIGHT: 70 IN | WEIGHT: 224 LBS | DIASTOLIC BLOOD PRESSURE: 80 MMHG | SYSTOLIC BLOOD PRESSURE: 130 MMHG | HEART RATE: 70 BPM

## 2020-10-20 PROCEDURE — 99396 PREV VISIT EST AGE 40-64: CPT | Performed by: NURSE PRACTITIONER

## 2020-10-20 PROCEDURE — 90686 IIV4 VACC NO PRSV 0.5 ML IM: CPT | Performed by: NURSE PRACTITIONER

## 2020-10-20 PROCEDURE — G8482 FLU IMMUNIZE ORDER/ADMIN: HCPCS | Performed by: NURSE PRACTITIONER

## 2020-10-20 PROCEDURE — G0008 ADMIN INFLUENZA VIRUS VAC: HCPCS | Performed by: NURSE PRACTITIONER

## 2020-10-20 ASSESSMENT — ENCOUNTER SYMPTOMS
VOMITING: 0
COUGH: 0
EYE PAIN: 0
BACK PAIN: 0
SINUS PAIN: 0
WHEEZING: 0
SINUS PRESSURE: 0
DIARRHEA: 0
SHORTNESS OF BREATH: 0
ABDOMINAL PAIN: 0
NAUSEA: 0

## 2020-10-20 NOTE — PROGRESS NOTES
[address] [address]     Phone:  [phone]  Fax:  [phone]      Cecelia Gracia is a 54 y.o. male who presents today for his medical conditions/complaints as noted below. Cecelia Gracia is c/o of Annual Exam      Chief Complaint   Patient presents with    Annual Exam       HPI:     HPI    Cecelia Gracia presents today for an annual exam.  He states he is doing well. Has a history of type 2 diabetes. Metformin was decreased at last visit. He states his glucose has been well controlled in the lower 100s to 120s. He also continues to take Januvia. He has a history of hypertension. Lisinopril was decreased at last visit. His blood pressure has been well controlled. He continues to take atorvastatin for hyperlipidemia. He denies any new myalgias. He is concerned about Gut health. He is taking probiotics. Hasn't seen dentist this year    Has had eye exam    Works full time. He tries to stay active to keep his weight down.     Past Medical History:   Diagnosis Date    Anxiety     Bipolar disorder ([facility] [address]     HTN (hypertension)     Hyperlipidemia         Past Surgical History:   Procedure Laterality Date    LAP BAND  placed 2009 with removal in 2011    PILONIDAL CYST EXCISION      WISDOM TOOTH EXTRACTION         Social History     Tobacco Use    Smoking status: Current Some Day Smoker     Types: Cigars    Smokeless tobacco: Never Used    Tobacco comment: patient does use a vape   Substance Use Topics    Alcohol use: No     Alcohol/week: 0.0 standard drinks        Current Outpatient Medications   Medication Sig Dispense Refill    SITagliptin (JANUVIA) 25 MG tablet Take 1 tablet by mouth daily 30 tablet 1    ARIPiprazole (ABILIFY) 15 MG tablet TAKE 1 TABLET EVERY DAY 90 tablet 2    atorvastatin (LIPITOR) 10 MG tablet TAKE 1 TABLET EVERY DAY 90 tablet 2    metFORMIN (GLUCOPHAGE) 500 MG tablet TAKE 1 TABLET TWICE DAILY WITH MEALS 180 tablet 2    lisinopril (PRINIVIL;ZESTRIL) 10 MG tablet TAKE 1 TABLET EVERY DAY 30 tablet 0    Omega-3 Fatty Acids (FISH OIL) 1000 MG CPDR Take 1,000 mg by mouth daily      ibuprofen (ADVIL;MOTRIN) 200 MG tablet Take 600 mg by mouth every 12 hours      famotidine (PEPCID) 20 MG tablet Take 20 mg by mouth daily      Potassium Gluconate 550 MG TABS Take 550 mg by mouth nightly        No current facility-administered medications for this visit. Allergies   Allergen Reactions    Oxycodone-Acetaminophen Other (See Comments)     Feels like \"bugs are crawling on me\"    Percocet [Oxycodone-Acetaminophen] Other (See Comments)     Feels like \"bugs\" are crawling on his body. History reviewed. No pertinent family history. Review of Systems   Constitutional: Negative for appetite change, fatigue and fever. HENT: Negative for congestion, sinus pressure and sinus pain. Eyes: Negative for pain and visual disturbance. Respiratory: Negative for cough, shortness of breath and wheezing. Cardiovascular: Negative for chest pain and leg swelling. Gastrointestinal: Negative for abdominal pain, diarrhea, nausea and vomiting. Endocrine: Negative for cold intolerance and heat intolerance. Genitourinary: Negative for dysuria, frequency and urgency. Musculoskeletal: Negative for arthralgias and back pain. Skin: Negative for rash and wound. Neurological: Negative for dizziness, weakness and headaches. Hematological: Negative for adenopathy. Psychiatric/Behavioral: Negative for dysphoric mood and sleep disturbance. The patient is not nervous/anxious. Objective:     Physical Exam  Vitals signs and nursing note reviewed. Constitutional:       General: He is not in acute distress. Appearance: He is well-developed. HENT:      Head: Normocephalic and atraumatic.       Right Ear: External ear normal.      Left Ear: External ear normal.      Nose: Nose normal.      Mouth/Throat: Pharynx: No oropharyngeal exudate. Eyes:      General:         Right eye: No discharge. Left eye: No discharge. Conjunctiva/sclera: Conjunctivae normal.      Pupils: Pupils are equal, round, and reactive to light. Neck:      Musculoskeletal: Normal range of motion and neck supple. Cardiovascular:      Rate and Rhythm: Normal rate and regular rhythm. Pulses: Normal pulses. Heart sounds: Normal heart sounds. No murmur. Pulmonary:      Effort: Pulmonary effort is normal. No respiratory distress. Breath sounds: Normal breath sounds. No stridor. No wheezing, rhonchi or rales. Abdominal:      General: Bowel sounds are normal. There is no distension. Palpations: Abdomen is soft. Tenderness: There is no abdominal tenderness. Musculoskeletal: Normal range of motion. Lumbar back: He exhibits normal range of motion and no tenderness. Right lower leg: No edema. Left lower leg: No edema. Lymphadenopathy:      Cervical: No cervical adenopathy. Skin:     General: Skin is warm and dry. Capillary Refill: Capillary refill takes less than 2 seconds. Findings: No rash. Neurological:      Mental Status: He is alert and oriented to person, place, and time. Mental status is at baseline. Psychiatric:         Mood and Affect: Mood normal.         Behavior: Behavior normal.         Thought Content: Thought content normal.         /80   Pulse 70   Temp 98.3 °F (36.8 °C) (Temporal)   Ht 5' 10\" (1.778 m)   Wt 224 lb (101.6 kg)   SpO2 98%   BMI 32.14 kg/m²     Assessment:      Diagnosis Orders   1. Annual physical exam     2. Essential hypertension  Comprehensive Metabolic Panel    CBC Auto Differential   3. Controlled type 2 diabetes mellitus with complication, without long-term current use of insulin ([facility])  Hemoglobin A1C   4. Mixed hyperlipidemia  Lipid Panel   5.  Flu vaccine need  INFLUENZA, QUADV, 3 YRS AND OLDER, IM PF, PREFILL SYR OR SDV, 0.5ML (Evelia Riley, PF)       No results found for this visit on 10/20/20. Plan:     Recommend prone probiotics for gut health. Also recommend a healthy diet complaining of water. Continue exercise regularly  Labs ordered. He is wanting to discontinue medications. Can consider discontinuing Januvia if A1c still controlled. We will need to follow closely. Vital signs stable. Flu vaccine given in office without reaction. Return in about 6 months (around 4/20/2021), or if symptoms worsen or fail to improve, for HTN, Diabetes, hyperlipidemia. Orders Placed This Encounter   Procedures    INFLUENZA, QUADV, 3 YRS AND OLDER, IM PF, PREFILL SYR OR SDV, 0.5ML (AFLURIA QUADV, PF)    Hemoglobin A1C     Standing Status:   Future     Standing Expiration Date:   10/20/2021    Comprehensive Metabolic Panel     Standing Status:   Future     Standing Expiration Date:   10/20/2021    CBC Auto Differential     Standing Status:   Future     Standing Expiration Date:   10/20/2021    Lipid Panel     Standing Status:   Future     Standing Expiration Date:   10/20/2021     Order Specific Question:   Is Patient Fasting?/# of Hours     Answer:   8       Orders Placed This Encounter   Medications    SITagliptin (JANUVIA) 25 MG tablet     Sig: Take 1 tablet by mouth daily     Dispense:  30 tablet     Refill:  1        Patient offered educational materials - see patient instructions for any instruction needed. Discussed use, benefit, and side effects of prescribed medications. All patient questions answered. Instructed to continue current medications, diet and exercise. Patient agreed with treatment plan. Follow up as directed. Patient was advised to go to the ED if condition ever becomes emergent.        Electronically signed by Ana Monzon on 10/23/2020 at 1:01 PM

## 2020-11-27 ENCOUNTER — HOSPITAL ENCOUNTER (EMERGENCY)
Facility: HOSPITAL | Age: 45
Discharge: HOME | End: 2020-11-27
Attending: EMERGENCY MEDICINE | Admitting: EMERGENCY MEDICINE

## 2020-11-27 VITALS
TEMPERATURE: 97.7 F | HEART RATE: 67 BPM | OXYGEN SATURATION: 97 % | DIASTOLIC BLOOD PRESSURE: 83 MMHG | BODY MASS INDEX: 31.5 KG/M2 | SYSTOLIC BLOOD PRESSURE: 129 MMHG | WEIGHT: 220 LBS | HEIGHT: 70 IN | RESPIRATION RATE: 18 BRPM

## 2020-11-27 DIAGNOSIS — S39.012A STRAIN OF LUMBAR REGION, INITIAL ENCOUNTER: Primary | ICD-10-CM

## 2020-11-27 PROCEDURE — 96372 THER/PROPH/DIAG INJ SC/IM: CPT

## 2020-11-27 PROCEDURE — 25010000002 KETOROLAC TROMETHAMINE PER 15 MG: Performed by: EMERGENCY MEDICINE

## 2020-11-27 PROCEDURE — 99283 EMERGENCY DEPT VISIT LOW MDM: CPT

## 2020-11-27 RX ORDER — KETOROLAC TROMETHAMINE 30 MG/ML
30 INJECTION, SOLUTION INTRAMUSCULAR; INTRAVENOUS ONCE
Status: COMPLETED | OUTPATIENT
Start: 2020-11-27 | End: 2020-11-27

## 2020-11-27 RX ORDER — KETOROLAC TROMETHAMINE 10 MG/1
10 TABLET, FILM COATED ORAL EVERY 6 HOURS PRN
Qty: 15 TABLET | Refills: 0 | OUTPATIENT
Start: 2020-11-27 | End: 2021-05-22

## 2020-11-27 RX ORDER — CYCLOBENZAPRINE HCL 10 MG
10 TABLET ORAL 3 TIMES DAILY PRN
Qty: 15 TABLET | Refills: 0 | OUTPATIENT
Start: 2020-11-27 | End: 2021-05-22

## 2020-11-27 RX ADMIN — KETOROLAC TROMETHAMINE 30 MG: 30 INJECTION, SOLUTION INTRAMUSCULAR; INTRAVENOUS at 05:20

## 2021-01-22 ENCOUNTER — OFFICE VISIT (OUTPATIENT)
Dept: PRIMARY CARE CLINIC | Age: 46
End: 2021-01-22
Payer: MEDICARE

## 2021-01-22 VITALS — HEART RATE: 73 BPM | TEMPERATURE: 98.7 F | OXYGEN SATURATION: 96 % | RESPIRATION RATE: 14 BRPM

## 2021-01-22 DIAGNOSIS — Z20.828 EXPOSURE TO SARS-ASSOCIATED CORONAVIRUS: Primary | ICD-10-CM

## 2021-01-22 PROCEDURE — 99212 OFFICE O/P EST SF 10 MIN: CPT | Performed by: NURSE PRACTITIONER

## 2021-01-22 PROCEDURE — 4004F PT TOBACCO SCREEN RCVD TLK: CPT | Performed by: NURSE PRACTITIONER

## 2021-01-22 PROCEDURE — G8427 DOCREV CUR MEDS BY ELIG CLIN: HCPCS | Performed by: NURSE PRACTITIONER

## 2021-01-22 PROCEDURE — G8482 FLU IMMUNIZE ORDER/ADMIN: HCPCS | Performed by: NURSE PRACTITIONER

## 2021-01-22 PROCEDURE — G8417 CALC BMI ABV UP PARAM F/U: HCPCS | Performed by: NURSE PRACTITIONER

## 2021-01-22 ASSESSMENT — ENCOUNTER SYMPTOMS
SINUS PAIN: 0
SORE THROAT: 0
RHINORRHEA: 0
ABDOMINAL PAIN: 0
COUGH: 0
CHEST TIGHTNESS: 0
SHORTNESS OF BREATH: 0

## 2021-01-22 NOTE — PATIENT INSTRUCTIONS
ASYMPTOMATIC COVID SCREEN . You were screened for COVID today and swabbed. You will be called with the results and any indicated treatments. You were provided with written CDC isolation/quarantine guidelines.

## 2021-01-22 NOTE — PROGRESS NOTES
No current facility-administered medications on file prior to visit. Allergies   Allergen Reactions    Oxycodone-Acetaminophen Other (See Comments)     Feels like \"bugs are crawling on me\"    Percocet [Oxycodone-Acetaminophen] Other (See Comments)     Feels like \"bugs\" are crawling on his body. Health Maintenance   Topic Date Due    Hepatitis C screen  1975    Diabetic retinal exam  03/29/1984    HIV screen  03/29/1989    Hepatitis B vaccine (1 of 3 - Risk 3-dose series) 03/29/1993    Diabetic foot exam  04/11/2019    Annual Wellness Visit (AWV)  05/29/2019    Diabetic microalbuminuria test  04/18/2020    Lipid screen  11/04/2020    Potassium monitoring  11/04/2020    Creatinine monitoring  11/04/2020    A1C test (Diabetic or Prediabetic)  02/17/2021    DTaP/Tdap/Td vaccine (2 - Td) 02/16/2026    Flu vaccine  Completed    Pneumococcal 0-64 years Vaccine  Completed    Hepatitis A vaccine  Aged Out    Hib vaccine  Aged Out    Meningococcal (ACWY) vaccine  Aged Out       Subjective:   Review of Systems   Constitutional: Negative for chills, fatigue and fever. HENT: Negative for congestion, ear pain, postnasal drip, rhinorrhea, sinus pain and sore throat. Respiratory: Negative for cough, chest tightness and shortness of breath. Cardiovascular: Negative for chest pain. Gastrointestinal: Negative for abdominal pain. Skin: Negative for rash. Objective:   Pulse 73   Temp 98.7 °F (37.1 °C) (Temporal)   Resp 14   SpO2 96%    Physical Exam  Vitals signs and nursing note reviewed. Constitutional:       Appearance: Normal appearance. HENT:      Head: Normocephalic. Pulmonary:      Effort: Pulmonary effort is normal.   Skin:     General: Skin is warm. Neurological:      Mental Status: He is alert. No results found for this visit on 01/22/21. Assessment:      Diagnosis Orders   1.  Exposure to SARS-associated coronavirus         Plan: Sheree Mendosa was seen today for covid testing. Diagnoses and all orders for this visit:    Exposure to SARS-associated coronavirus       Diatherex sent to lab for COVID  No follow-ups on file. ASYMPTOMATIC COVID SCREEN . You were screened for COVID today and swabbed. You will be called with the results and any indicated treatments. You were provided with written [facility] isolation/quarantine guidelines. Patient given educational materials- see patient instructions. Discussed use, benefit, and side effects of prescribedmedications. All patient questions answered. Pt voiced understanding.      Electronically signed by CATHIE White CNP on 1/22/2021 at 11:09 AM

## 2021-01-22 NOTE — LETTER
[facility] ([facility]) [facility] [address]  Phone: [phone]  Fax: [phone]    CATHIE Engle CNP        January 22, 2021     Patient: Vipul Alcantara   YOB: 1975   Date of Visit: 1/22/2021       To Whom it May Concern:    Nelly Diaz was seen in my clinic on 1/22/2021. Please excuse from work until 1/25/2021. If you have any questions or concerns, please don't hesitate to call.     Sincerely,         CATHIE Engle CNP

## 2021-01-25 ENCOUNTER — TELEPHONE (OUTPATIENT)
Dept: PRIMARY CARE CLINIC | Age: 46
End: 2021-01-25

## 2021-05-22 ENCOUNTER — HOSPITAL ENCOUNTER (EMERGENCY)
Facility: HOSPITAL | Age: 46
Discharge: HOME | End: 2021-05-22
Attending: EMERGENCY MEDICINE | Admitting: EMERGENCY MEDICINE

## 2021-05-22 VITALS
SYSTOLIC BLOOD PRESSURE: 145 MMHG | HEART RATE: 85 BPM | TEMPERATURE: 98.2 F | RESPIRATION RATE: 16 BRPM | WEIGHT: 225 LBS | DIASTOLIC BLOOD PRESSURE: 82 MMHG | OXYGEN SATURATION: 97 % | HEIGHT: 70 IN | BODY MASS INDEX: 32.21 KG/M2

## 2021-05-22 DIAGNOSIS — K02.9 PAIN DUE TO DENTAL CARIES: Primary | ICD-10-CM

## 2021-05-22 PROCEDURE — 96372 THER/PROPH/DIAG INJ SC/IM: CPT

## 2021-05-22 PROCEDURE — 25010000002 CEFTRIAXONE PER 250 MG: Performed by: EMERGENCY MEDICINE

## 2021-05-22 PROCEDURE — 25010000003 LIDOCAINE 1 % SOLUTION 20 ML VIAL: Performed by: EMERGENCY MEDICINE

## 2021-05-22 PROCEDURE — 99282 EMERGENCY DEPT VISIT SF MDM: CPT

## 2021-05-22 RX ORDER — AMOXICILLIN 500 MG/1
500 CAPSULE ORAL 3 TIMES DAILY
Qty: 30 CAPSULE | Refills: 0 | Status: SHIPPED | OUTPATIENT
Start: 2021-05-22

## 2021-05-22 RX ORDER — KETOROLAC TROMETHAMINE 10 MG/1
10 TABLET, FILM COATED ORAL EVERY 6 HOURS PRN
Qty: 15 TABLET | Refills: 0 | Status: SHIPPED | OUTPATIENT
Start: 2021-05-22

## 2021-05-22 RX ADMIN — CEFTRIAXONE SODIUM 250 MG: 500 INJECTION, POWDER, FOR SOLUTION INTRAMUSCULAR; INTRAVENOUS at 02:34

## 2021-05-22 NOTE — ED PROVIDER NOTES
"Subjective   Patient complains of severe pain in the left upper tooth that has cavity or caries already in it.  He is afraid he is developing an abscess.  The pain to become unbearable tonight.  He tried over-the-counter medicines but they have not helped.      History provided by:  Patient   used: No    Dental Pain  Location:  Upper  Upper teeth location:  12/LU 1st bicuspid  Quality:  Aching  Severity:  Severe  Onset quality:  Gradual  Duration:  1 day  Timing:  Constant  Progression:  Worsening  Chronicity:  New  Context: dental caries    Context: not abscess, cap still on, not crown fracture, not dental fracture, not enamel fracture, filling intact, not intrusion, not malocclusion, normal dentition, not recent dental surgery and not trauma    Relieved by:  Nothing  Worsened by:  Nothing  Ineffective treatments:  None tried  Associated symptoms: no congestion, no difficulty swallowing, no drooling, no facial pain, no facial swelling, no fever, no gum swelling, no headaches, no neck pain, no neck swelling, no oral bleeding, no oral lesions and no trismus    Risk factors: no alcohol problem, no chewing tobacco use, no diabetes, no immunosuppression and no periodontal disease        Review of Systems   Constitutional: Negative.  Negative for fever.   HENT: Negative.  Negative for congestion, drooling, facial swelling and mouth sores.    Respiratory: Negative.    Cardiovascular: Negative.    Gastrointestinal: Negative.    Genitourinary: Negative.    Musculoskeletal: Negative.  Negative for neck pain.   Skin: Negative.    Neurological: Negative.  Negative for headaches.   Psychiatric/Behavioral: Negative.    All other systems reviewed and are negative.      Past Medical History:   Diagnosis Date   • Bipolar 1 disorder (CMS/[facility])    • Diabetes mellitus (CMS/[facility]ly [facility])    • Hypertension        Allergies   Allergen Reactions   • Percocet [Oxycodone-Acetaminophen] Other (See Comments)     Feels like \"bugs are " "crawling on me\"       Past Surgical History:   Procedure Laterality Date   • COLONOSCOPY     • GASTRIC BANDING REMOVAL     • LAPAROSCOPIC GASTRIC BANDING         No family history on file.    Social History     Socioeconomic History   • Marital status:      Spouse name: Not on file   • Number of children: Not on file   • Years of education: Not on file   • Highest education level: Not on file   Tobacco Use   • Smoking status: Current Every Day Smoker     Packs/day: 0.50     Types: Cigars   Substance and Sexual Activity   • Alcohol use: Yes     Comment: occasionally   • Drug use: Yes     Types: Marijuana     Comment: last use a week ago    • Sexual activity: Defer       Prior to Admission medications    Medication Sig Start Date End Date Taking? Authorizing Provider   ARIPiprazole (ABILIFY) 15 MG tablet Take 15 mg by mouth Daily.    ProviderLiborio MD   atorvastatin (LIPITOR) 10 MG tablet Take 10 mg by mouth Daily.    Liborio Marcus MD   cyclobenzaprine (FLEXERIL) 10 MG tablet Take 1 tablet by mouth 3 (Three) Times a Day As Needed for Muscle Spasms. 11/27/20   Junior Schulz MD   ketorolac (TORADOL) 10 MG tablet Take 1 tablet by mouth Every 6 (Six) Hours As Needed for Moderate Pain . 11/27/20   Junior Schulz MD   lisinopril (PRINIVIL,ZESTRIL) 40 MG tablet Take 20 mg by mouth Daily.    Liborio Marcus MD   metFORMIN (GLUCOPHAGE) 1000 MG tablet Take 500 mg by mouth 2 (Two) Times a Day With Meals.    Liborio Marcus MD   ondansetron ODT (ZOFRAN-ODT) 8 MG disintegrating tablet Place 1 tablet on the tongue Every 8 (Eight) Hours As Needed for Nausea or Vomiting. 9/20/20   Susan Patel APRN   SITagliptin (JANUVIA) 50 MG tablet Take 25 mg by mouth Daily.    Liborio Marcus MD       Medications   cefTRIAXone (ROCEPHIN) 250 mg in lidocaine (XYLOCAINE) 1 % IM only syringe (has no administration in time range)       Vitals:    05/22/21 0207   BP: 149/73   Pulse: 89 "   Resp: 20   Temp: 98.4 °F (36.9 °C)   SpO2: 96%         Objective   Physical Exam  Vitals and nursing note reviewed.   Constitutional:       Appearance: Normal appearance.   HENT:      Head: Normocephalic and atraumatic.      Mouth/Throat:      Mouth: Mucous membranes are moist.      Pharynx: Oropharynx is clear.      Comments: Patient has a dental caries in the left upper tooth that looks to be the first bicuspid.  There is no obvious swelling or abscess or purulence at the present time.  Eyes:      Extraocular Movements: Extraocular movements intact.      Pupils: Pupils are equal, round, and reactive to light.   Musculoskeletal:         General: Normal range of motion.      Cervical back: Normal range of motion and neck supple.   Skin:     General: Skin is warm and dry.   Neurological:      General: No focal deficit present.      Mental Status: He is alert and oriented to person, place, and time.   Psychiatric:         Mood and Affect: Mood normal.         Behavior: Behavior normal.         Procedures         Lab Results (last 24 hours)     ** No results found for the last 24 hours. **          No orders to display       ED Course  ED Course as of May 22 0232   Sat May 22, 2021   0229 I told the patient only definitive fix would obviously to be a send a dentist.  He is trying to get that done now.  In the meantime we will give him some antibiotics and some for the pain.  He is discharged in stable condition.    [TR]      ED Course User Index  [TR] Jeancarlos Whitfield Jr., MD          MDM  Number of Diagnoses or Management Options  Pain due to dental caries: new and does not require workup  Risk of Complications, Morbidity, and/or Mortality  Presenting problems: moderate  Diagnostic procedures: minimal  Management options: moderate    Patient Progress  Patient progress: stable      Final diagnoses:   Pain due to dental caries          Jeancarlos Whitfield Jr., MD  05/22/21 0232

## 2021-07-02 ENCOUNTER — OFFICE VISIT (OUTPATIENT)
Dept: INTERNAL MEDICINE | Age: 46
End: 2021-07-02

## 2021-07-02 ENCOUNTER — TELEPHONE (OUTPATIENT)
Dept: INTERNAL MEDICINE | Age: 46
End: 2021-07-02

## 2021-07-02 VITALS
HEART RATE: 70 BPM | OXYGEN SATURATION: 98 % | BODY MASS INDEX: 33.36 KG/M2 | HEIGHT: 70 IN | WEIGHT: 233 LBS | SYSTOLIC BLOOD PRESSURE: 138 MMHG | DIASTOLIC BLOOD PRESSURE: 86 MMHG | RESPIRATION RATE: 18 BRPM

## 2021-07-02 DIAGNOSIS — Z13.31 POSITIVE DEPRESSION SCREENING: Primary | ICD-10-CM

## 2021-07-02 PROCEDURE — 99202 OFFICE O/P NEW SF 15 MIN: CPT | Performed by: INTERNAL MEDICINE

## 2021-07-02 PROCEDURE — G8431 POS CLIN DEPRES SCRN F/U DOC: HCPCS | Performed by: INTERNAL MEDICINE

## 2021-07-02 RX ORDER — HYDROXYZINE HYDROCHLORIDE 25 MG/1
TABLET, FILM COATED ORAL
COMMUNITY
Start: 2021-06-24

## 2021-07-02 RX ORDER — LAMOTRIGINE 100 MG/1
100 TABLET ORAL DAILY
COMMUNITY
Start: 2021-06-23

## 2021-07-02 ASSESSMENT — PATIENT HEALTH QUESTIONNAIRE - PHQ9
SUM OF ALL RESPONSES TO PHQ QUESTIONS 1-9: 18
8. MOVING OR SPEAKING SO SLOWLY THAT OTHER PEOPLE COULD HAVE NOTICED. OR THE OPPOSITE, BEING SO FIGETY OR RESTLESS THAT YOU HAVE BEEN MOVING AROUND A LOT MORE THAN USUAL: 3
3. TROUBLE FALLING OR STAYING ASLEEP: 0
7. TROUBLE CONCENTRATING ON THINGS, SUCH AS READING THE NEWSPAPER OR WATCHING TELEVISION: 3
SUM OF ALL RESPONSES TO PHQ QUESTIONS 1-9: 18
5. POOR APPETITE OR OVEREATING: 0
4. FEELING TIRED OR HAVING LITTLE ENERGY: 3
SUM OF ALL RESPONSES TO PHQ QUESTIONS 1-9: 18
10. IF YOU CHECKED OFF ANY PROBLEMS, HOW DIFFICULT HAVE THESE PROBLEMS MADE IT FOR YOU TO DO YOUR WORK, TAKE CARE OF THINGS AT HOME, OR GET ALONG WITH OTHER PEOPLE: 3
1. LITTLE INTEREST OR PLEASURE IN DOING THINGS: 3
SUM OF ALL RESPONSES TO PHQ9 QUESTIONS 1 & 2: 6
2. FEELING DOWN, DEPRESSED OR HOPELESS: 3
6. FEELING BAD ABOUT YOURSELF - OR THAT YOU ARE A FAILURE OR HAVE LET YOURSELF OR YOUR FAMILY DOWN: 3
9. THOUGHTS THAT YOU WOULD BE BETTER OFF DEAD, OR OF HURTING YOURSELF: 0

## 2021-07-02 ASSESSMENT — COLUMBIA-SUICIDE SEVERITY RATING SCALE - C-SSRS
6. HAVE YOU EVER DONE ANYTHING, STARTED TO DO ANYTHING, OR PREPARED TO DO ANYTHING TO END YOUR LIFE?: NO
2. HAVE YOU ACTUALLY HAD ANY THOUGHTS OF KILLING YOURSELF?: NO
1. WITHIN THE PAST MONTH, HAVE YOU WISHED YOU WERE DEAD OR WISHED YOU COULD GO TO SLEEP AND NOT WAKE UP?: NO

## 2021-07-02 NOTE — PROGRESS NOTES
Came in is a new work-up. Started in on going through his history. He became quite agitated but the questions that were being asked about his past medical history and things that are going on his present history. Every question I asked was met with pushback on his part and rambling nonsensical talk. I told him that unless he asked my questions about his past medical history and his present history that we could not go forward with the relationship. He told me that it was crazy all I need to do was get his orthopedic issues fixed today and that was the only problem that he had. He has a list of maintenance medications I will send that he is not taking. He refused to answer my questions about his past history and I told him that we could not go forward with the relationship and he left.   I will not be seeing him back
~1 week

## 2021-08-07 ENCOUNTER — OFFICE VISIT (OUTPATIENT)
Dept: URGENT CARE | Age: 46
End: 2021-08-07
Payer: MEDICARE

## 2021-08-07 VITALS
HEIGHT: 70 IN | WEIGHT: 236 LBS | SYSTOLIC BLOOD PRESSURE: 147 MMHG | DIASTOLIC BLOOD PRESSURE: 93 MMHG | TEMPERATURE: 97.8 F | BODY MASS INDEX: 33.79 KG/M2 | OXYGEN SATURATION: 98 % | HEART RATE: 106 BPM | RESPIRATION RATE: 18 BRPM

## 2021-08-07 DIAGNOSIS — L08.9 STAPH SKIN INFECTION: Primary | ICD-10-CM

## 2021-08-07 DIAGNOSIS — B95.8 STAPH SKIN INFECTION: Primary | ICD-10-CM

## 2021-08-07 PROCEDURE — 99213 OFFICE O/P EST LOW 20 MIN: CPT | Performed by: NURSE PRACTITIONER

## 2021-08-07 RX ORDER — SULFAMETHOXAZOLE AND TRIMETHOPRIM 800; 160 MG/1; MG/1
1 TABLET ORAL 2 TIMES DAILY
Qty: 20 TABLET | Refills: 0 | Status: SHIPPED | OUTPATIENT
Start: 2021-08-07 | End: 2021-08-17

## 2021-08-07 ASSESSMENT — ENCOUNTER SYMPTOMS
ALLERGIC/IMMUNOLOGIC NEGATIVE: 1
COLOR CHANGE: 1

## 2021-08-07 ASSESSMENT — VISUAL ACUITY: OU: 1

## 2021-08-07 NOTE — PATIENT INSTRUCTIONS
Good hand hygiene  Bactrim as directed  Follow up with PCP or return to Urgent Care for worsening or unresolved symptoms. Patient Education        Learning About Staph Infection  What is a staph infection? Staphylococcus aureus (staph) is a type of bacteria that can cause infections. Staph bacteria normally live on the skin. They don't usually cause problems. They only become a problem when they cause infection. The infection has a higher chance of becoming serious in people who are weak or ill or who are being treated in the hospital. Sometimes staph bacteria can cause more serious widespread infection. In the hospital, staph infections are more likely to occur in wounds, burns, or places where there is a break in the skin or where tubes enter the body. In the community, these infections are more likely to occur among people who have cuts or wounds and who have close contact with one another. What are the symptoms? Symptoms of a staph infection depend on where the infection is. If the infection is:  · In a wound, that area of your skin may be red or tender. · On your skin, you may get a red, tender boil or abscess. You may think you have been bitten by a spider or insect. · In your urine, you may have symptoms of a urinary tract infection. These include burning when you urinate. · In your blood or more widespread, you may have a fever and feel very ill. How is a staph infection treated? The doctor will take a sample of your infected wound or a blood or urine sample. The sample is tested to see which antibiotics can kill the bacteria in it. This test may take several days. If you have a staph infection, your doctor may:  · Drain your wound. · Give you antibiotics as pills or through a needle put in your vein (IV). You may have to stay in the hospital for treatment. In the hospital, you may be kept apart from others. This is to reduce the chances of spreading the bacteria.   How can you prevent a staph infection? · Practice good hygiene. ? Wash your hands often with soap and clean, running water. You can also use an alcohol-based hand . Hand-washing is the best way to avoid spreading the bacteria. ? Keep cuts and scrapes clean. Cover them with a bandage. Avoid contact with other people's wounds or bandages. ? Don't share personal items such as towels, washcloths, razors, or clothing. ? Keep your environment clean by using a disinfectant to wipe surfaces you touch a lot. These include countertops, doorknobs, and light switches. · Your doctor may give you an ointment to put inside your nose. This is to kill staph bacteria that may cause another infection. · Be smart about using antibiotics. Antibiotics can help treat bacterial infections, but they can't cure viral infections. Always ask your doctor if antibiotics are the best treatment. · If your doctor prescribed antibiotics, take them as directed. Do not stop taking them just because you feel better. You need to take the full course of antibiotics. · If you're in the hospital, remind doctors and nurses to wash their hands before they touch you. Follow-up care is a key part of your treatment and safety. Be sure to make and go to all appointments, and call your doctor if you are having problems. It's also a good idea to know your test results and keep a list of the medicines you take. Where can you learn more? Go to [email or web address]://[email or web address].[email or web address]. org and sign in to your [employer] ([employer]) account. Enter D216 in the [facility] box to learn more about \"Learning About Staph Infection. \"     If you do not have an account, please click on the \"Sign Up Now\" link. Current as of: September 23, 2020               Content Version: 12.9  © 2006-2021 [employer]. Care instructions adapted under license by [facility] ([facility]  If you have questions about a medical condition or this instruction, always ask your healthcare professional. [employer] disclaims any warranty or liability for your use of this information.

## 2021-08-07 NOTE — PROGRESS NOTES
[address] [facility]  [address] [address] [place] [address]  Dept: [phone]  Dept Fax: [phone]  Loc: [phone]    Alexy Moe is a 51 y.o. male who presents today for his medical conditions/complaintsas noted below. Alexy Moe is c/o of Rash (arms; possible insect bites)        HPI:     Rash  This is a chronic problem. The current episode started more than 1 month ago (2 months). The problem is unchanged. Location: Left forearm, right upper arms, right lower leg, right axilla. The rash is characterized by redness, itchiness, burning and draining. It is unknown if there was an exposure to a precipitant. Pertinent negatives include no fatigue or fever. Past treatments include antibiotic cream (Alcohol ). The treatment provided mild relief. Romina Montague reports bites or something that started on his left arm and are continuing to spread for 2 months with no relief. The area to his left forearm is the worst area and is painful to touch. He has applied some alcohol to the lesions at times and covered them with a band-aid to keep from scratching them. Past Medical History:   Diagnosis Date    Anxiety     Bipolar disorder ([address]     HTN (hypertension)     Hyperlipidemia      Past Surgical History:   Procedure Laterality Date    KNEE SURGERY      LAP BAND  placed 2009 with removal in 2011    PILONIDAL CYST EXCISION      WISDOM TOOTH EXTRACTION         No family history on file.     Social History     Tobacco Use    Smoking status: Current Some Day Smoker     Types: Cigars    Smokeless tobacco: Never Used    Tobacco comment: patient does use a vape   Substance Use Topics    Alcohol use: No     Alcohol/week: 0.0 standard drinks      Current Outpatient Medications   Medication Sig Dispense Refill    sulfamethoxazole-trimethoprim (BACTRIM DS;SEPTRA DS) 800-160 MG per tablet Take 1 tablet by mouth 2 times daily for 10 days 20 tablet 0    lamoTRIgine (LAMICTAL) 25 MG tablet TAKE 1 DAILY X 14 DAYS, THEN 2 DAILY X 14 DAYS, THEN 4 DAILY THEREAFTER      hydrOXYzine (ATARAX) 25 MG tablet TAKE 1 TABLET AS DIRECTED 1 TO 3 TIMES DAILY      JANUVIA 25 MG tablet TAKE 1 TABLET EVERY DAY (Patient not taking: Reported on 7/2/2021) 60 tablet 3    ARIPiprazole (ABILIFY) 15 MG tablet TAKE 1 TABLET EVERY DAY (Patient not taking: Reported on 7/2/2021) 90 tablet 2    atorvastatin (LIPITOR) 10 MG tablet TAKE 1 TABLET EVERY DAY (Patient not taking: Reported on 7/2/2021) 90 tablet 2    metFORMIN (GLUCOPHAGE) 500 MG tablet TAKE 1 TABLET TWICE DAILY WITH MEALS (Patient not taking: Reported on 7/2/2021) 180 tablet 2    lisinopril (PRINIVIL;ZESTRIL) 10 MG tablet TAKE 1 TABLET EVERY DAY (Patient not taking: Reported on 7/2/2021) 30 tablet 0     No current facility-administered medications for this visit. Allergies   Allergen Reactions    Oxycodone-Acetaminophen Other (See Comments)     Feels like \"bugs are crawling on me\"    Percocet [Oxycodone-Acetaminophen] Other (See Comments)     Feels like \"bugs\" are crawling on his body.        Health Maintenance   Topic Date Due    Hepatitis C screen  Never done    Diabetic retinal exam  Never done    HIV screen  Never done    Hepatitis B vaccine (1 of 3 - Risk 3-dose series) Never done    Colon cancer screen colonoscopy  Never done    Diabetic foot exam  04/11/2019    Annual Wellness Visit (AWV)  Never done    Diabetic microalbuminuria test  04/18/2020    Lipid screen  11/04/2020    Potassium monitoring  11/04/2020    Creatinine monitoring  11/04/2020    A1C test (Diabetic or Prediabetic)  02/17/2021    Flu vaccine (1) 09/01/2021    DTaP/Tdap/Td vaccine (2 - Td or Tdap) 02/16/2026    Pneumococcal 0-64 years Vaccine (2 of 2 - PPSV23) 03/29/2039    COVID-19 Vaccine  Completed    Hepatitis A vaccine  Aged Out    Hib vaccine  Aged Out    Meningococcal (ACWY) vaccine  Aged Out       Subjective:     Review of Systems Constitutional: Negative for activity change, appetite change, chills, fatigue and fever. Musculoskeletal: Negative for arthralgias and myalgias. Skin: Positive for color change and rash. Allergic/Immunologic: Negative. Hematological: Negative.        :Objective      Physical Exam  Vitals and nursing note reviewed. Constitutional:       General: He is awake. He is not in acute distress. Appearance: Normal appearance. He is well-developed and overweight. He is not ill-appearing. HENT:      Head: Normocephalic. Right Ear: Hearing normal.      Left Ear: Hearing normal.      Nose: Nose normal.      Mouth/Throat:      Lips: Pink. Eyes:      General: Vision grossly intact. Cardiovascular:      Rate and Rhythm: Normal rate and regular rhythm. Pulmonary:      Effort: Pulmonary effort is normal. No respiratory distress. Chest:      Chest wall: No tenderness. Musculoskeletal:         General: No tenderness or deformity. Normal range of motion. Cervical back: Neck supple. Skin:     General: Skin is warm and dry. Capillary Refill: Capillary refill takes less than 2 seconds. Findings: Erythema, rash and wound present. Rash is papular. Comments: Several papular red lesions in an L shape on his left forearm, some of the lesions are scabbed and erythematic, no obvious drainage, area is slightly firm and tender to touch  2 similar looking lesions to right upper arm, no scabs or drainage noted  Small abscess in right axilla noted firm to touch, tender, area is not open or draining, area is approximately the size of a pencil eraser  One erythematic open lesion on his right outer calf, no drainage noted at this time     Neurological:      General: No focal deficit present. Mental Status: He is alert, oriented to person, place, and time and easily aroused. Psychiatric:         Attention and Perception: Attention normal.         Mood and Affect: Mood is anxious. Speech: Speech is rapid and pressured. Behavior: Behavior is cooperative. BP (!) 147/93   Pulse 106   Temp 97.8 °F (36.6 °C)   Resp 18   Ht 5' 10\" (1.778 m)   Wt 236 lb (107 kg)   SpO2 98%   BMI 33.86 kg/m²     :Assessment       Diagnosis Orders   1. Staph skin infection         :Plan    No orders of the defined types were placed in this encounter. No follow-ups on file. Orders Placed This Encounter   Medications    sulfamethoxazole-trimethoprim (BACTRIM DS;SEPTRA DS) 800-160 MG per tablet     Sig: Take 1 tablet by mouth 2 times daily for 10 days     Dispense:  20 tablet     Refill:  0        Patient Instructions   Good hand hygiene  Bactrim as directed  Follow up with PCP or return to Urgent Care for worsening or unresolved symptoms. Patient Education        Learning About Staph Infection  What is a staph infection? Staphylococcus aureus (staph) is a type of bacteria that can cause infections. Staph bacteria normally live on the skin. They don't usually cause problems. They only become a problem when they cause infection. The infection has a higher chance of becoming serious in people who are weak or ill or who are being treated in the hospital. Sometimes staph bacteria can cause more serious widespread infection. In the hospital, staph infections are more likely to occur in wounds, burns, or places where there is a break in the skin or where tubes enter the body. In the community, these infections are more likely to occur among people who have cuts or wounds and who have close contact with one another. What are the symptoms? Symptoms of a staph infection depend on where the infection is. If the infection is:  · In a wound, that area of your skin may be red or tender. · On your skin, you may get a red, tender boil or abscess. You may think you have been bitten by a spider or insect. · In your urine, you may have symptoms of a urinary tract infection.  These include burning when you urinate. · In your blood or more widespread, you may have a fever and feel very ill. How is a staph infection treated? The doctor will take a sample of your infected wound or a blood or urine sample. The sample is tested to see which antibiotics can kill the bacteria in it. This test may take several days. If you have a staph infection, your doctor may:  · Drain your wound. · Give you antibiotics as pills or through a needle put in your vein (IV). You may have to stay in the hospital for treatment. In the hospital, you may be kept apart from others. This is to reduce the chances of spreading the bacteria. How can you prevent a staph infection? · Practice good hygiene. ? Wash your hands often with soap and clean, running water. You can also use an alcohol-based hand . Hand-washing is the best way to avoid spreading the bacteria. ? Keep cuts and scrapes clean. Cover them with a bandage. Avoid contact with other people's wounds or bandages. ? Don't share personal items such as towels, washcloths, razors, or clothing. ? Keep your environment clean by using a disinfectant to wipe surfaces you touch a lot. These include countertops, doorknobs, and light switches. · Your doctor may give you an ointment to put inside your nose. This is to kill staph bacteria that may cause another infection. · Be smart about using antibiotics. Antibiotics can help treat bacterial infections, but they can't cure viral infections. Always ask your doctor if antibiotics are the best treatment. · If your doctor prescribed antibiotics, take them as directed. Do not stop taking them just because you feel better. You need to take the full course of antibiotics. · If you're in the hospital, remind doctors and nurses to wash their hands before they touch you. Follow-up care is a key part of your treatment and safety. Be sure to make and go to all appointments, and call your doctor if you are having problems.  It's

## 2021-08-10 ENCOUNTER — HOSPITAL ENCOUNTER (OUTPATIENT)
Dept: GENERAL RADIOLOGY | Age: 46
Discharge: HOME | End: 2021-08-10
Payer: MEDICARE

## 2021-08-10 ENCOUNTER — OFFICE VISIT (OUTPATIENT)
Dept: URGENT CARE | Age: 46
End: 2021-08-10
Payer: MEDICARE

## 2021-08-10 VITALS
WEIGHT: 234 LBS | OXYGEN SATURATION: 97 % | HEART RATE: 109 BPM | SYSTOLIC BLOOD PRESSURE: 146 MMHG | DIASTOLIC BLOOD PRESSURE: 99 MMHG | TEMPERATURE: 98.3 F | HEIGHT: 70 IN | BODY MASS INDEX: 33.5 KG/M2

## 2021-08-10 DIAGNOSIS — K13.70 LESION OF MOUTH: Primary | ICD-10-CM

## 2021-08-10 DIAGNOSIS — R07.81 RIB PAIN ON LEFT SIDE: ICD-10-CM

## 2021-08-10 DIAGNOSIS — R10.9 ACUTE LEFT FLANK PAIN: ICD-10-CM

## 2021-08-10 LAB
APPEARANCE FLUID: ABNORMAL
BILIRUBIN, POC: ABNORMAL
BLOOD URINE, POC: ABNORMAL
CLARITY, POC: CLEAR
COLOR, POC: YELLOW
GLUCOSE URINE, POC: ABNORMAL
KETONES, POC: ABNORMAL
LEUKOCYTE EST, POC: ABNORMAL
NITRITE, POC: ABNORMAL
PH, POC: 5.5
PROTEIN, POC: ABNORMAL
SPECIFIC GRAVITY, POC: 1.02
UROBILINOGEN, POC: 0.2

## 2021-08-10 PROCEDURE — 71101 X-RAY EXAM UNILAT RIBS/CHEST: CPT

## 2021-08-10 PROCEDURE — 81002 URINALYSIS NONAUTO W/O SCOPE: CPT | Performed by: NURSE PRACTITIONER

## 2021-08-10 PROCEDURE — 99213 OFFICE O/P EST LOW 20 MIN: CPT | Performed by: NURSE PRACTITIONER

## 2021-08-10 RX ORDER — TIZANIDINE 4 MG/1
4 TABLET ORAL NIGHTLY PRN
Qty: 10 TABLET | Refills: 0 | Status: SHIPPED | OUTPATIENT
Start: 2021-08-10

## 2021-08-10 ASSESSMENT — ENCOUNTER SYMPTOMS
SINUS PRESSURE: 0
NAUSEA: 0
SORE THROAT: 0
VOMITING: 0
BOWEL INCONTINENCE: 0
ABDOMINAL PAIN: 0
DIARRHEA: 0

## 2021-08-10 ASSESSMENT — VISUAL ACUITY: OU: 1

## 2021-08-10 NOTE — PATIENT INSTRUCTIONS
Plenty of fluids  Rest  OTC Tylenol or Motrin as needed for pain  Zanaflex as directed at bedtime  Continue Bactrim as directed  Talk to pharmacist about medication for dry mouth  Keep appt on Thurs with your PCP

## 2021-08-10 NOTE — PROGRESS NOTES
[address] [facility] URGENT [facility]ARE  [address] [address] [place] [address]  Dept: [phone]  Dept Fax: [phone]  Loc: [phone]    Elsie Galan is a 51 y.o. male who presents today for his medical conditions/complaintsas noted below. Elsie Galan is c/o of Flank Pain (L side, onset last night, no known injury) and Dental Pain (thinks he has staph in his mouth, feels different)        HPI:     Flank Pain  This is a new problem. The current episode started yesterday (last night). The problem occurs constantly. The problem is unchanged. Pain location: Left rib/flank. The quality of the pain is described as shooting. The pain does not radiate. The pain is at a severity of 7/10. The pain is severe. The pain is the same all the time. The symptoms are aggravated by twisting, sitting and position. Pertinent negatives include no abdominal pain, bladder incontinence, bowel incontinence, dysuria, fever, paresis or paresthesias. Risk factors include obesity and sedentary lifestyle. He has tried NSAIDs for the symptoms. The treatment provided mild relief. Gena Regalado began having left rib/flank pain yesterday. He has had no fall or injury but is pain is worse with deep breathing, sneezing, or any movement really. He was seen here on Sat and it taking Bactrim for staph infection and has a lesion inside his mouth and under his tongue is sensitive as well. He is concerned about and it wondering if it is staph in his mouth. His teeth are in poor repair. Past Medical History:   Diagnosis Date    Anxiety     Bipolar disorder ([address]     HTN (hypertension)     Hyperlipidemia      Past Surgical History:   Procedure Laterality Date    KNEE SURGERY      LAP BAND  placed 2009 with removal in 2011    PILONIDAL CYST EXCISION      WISDOM TOOTH EXTRACTION         No family history on file.     Social History     Tobacco Use    Smoking status: Current Some Day Smoker     Types: Cigars    Smokeless tobacco: Never Used    Tobacco comment: patient does use a vape   Substance Use Topics    Alcohol use: No     Alcohol/week: 0.0 standard drinks      Current Outpatient Medications   Medication Sig Dispense Refill    tiZANidine (ZANAFLEX) 4 MG tablet Take 1 tablet by mouth nightly as needed (muscle spasm) 10 tablet 0    sulfamethoxazole-trimethoprim (BACTRIM DS;SEPTRA DS) 800-160 MG per tablet Take 1 tablet by mouth 2 times daily for 10 days 20 tablet 0    lamoTRIgine (LAMICTAL) 25 MG tablet TAKE 1 DAILY X 14 DAYS, THEN 2 DAILY X 14 DAYS, THEN 4 DAILY THEREAFTER      hydrOXYzine (ATARAX) 25 MG tablet TAKE 1 TABLET AS DIRECTED 1 TO 3 TIMES DAILY      JANUVIA 25 MG tablet TAKE 1 TABLET EVERY DAY (Patient not taking: Reported on 7/2/2021) 60 tablet 3    ARIPiprazole (ABILIFY) 15 MG tablet TAKE 1 TABLET EVERY DAY (Patient not taking: Reported on 7/2/2021) 90 tablet 2    atorvastatin (LIPITOR) 10 MG tablet TAKE 1 TABLET EVERY DAY (Patient not taking: Reported on 7/2/2021) 90 tablet 2    metFORMIN (GLUCOPHAGE) 500 MG tablet TAKE 1 TABLET TWICE DAILY WITH MEALS (Patient not taking: Reported on 7/2/2021) 180 tablet 2    lisinopril (PRINIVIL;ZESTRIL) 10 MG tablet TAKE 1 TABLET EVERY DAY (Patient not taking: Reported on 7/2/2021) 30 tablet 0     No current facility-administered medications for this visit. Allergies   Allergen Reactions    Oxycodone-Acetaminophen Other (See Comments)     Feels like \"bugs are crawling on me\"    Percocet [Oxycodone-Acetaminophen] Other (See Comments)     Feels like \"bugs\" are crawling on his body.        Health Maintenance   Topic Date Due    Hepatitis C screen  Never done    Diabetic retinal exam  Never done    HIV screen  Never done    Hepatitis B vaccine (1 of 3 - Risk 3-dose series) Never done    Colon cancer screen colonoscopy  Never done    Diabetic foot exam  04/11/2019    Annual Wellness Visit (AWV)  Never done    Diabetic microalbuminuria test 04/18/2020    Lipid screen  11/04/2020    Potassium monitoring  11/04/2020    Creatinine monitoring  11/04/2020    A1C test (Diabetic or Prediabetic)  02/17/2021    Flu vaccine (1) 09/01/2021    DTaP/Tdap/Td vaccine (2 - Td or Tdap) 02/16/2026    Pneumococcal 0-64 years Vaccine (2 of 2 - PPSV23) 03/29/2039    COVID-19 Vaccine  Completed    Hepatitis A vaccine  Aged Out    Hib vaccine  Aged Out    Meningococcal (ACWY) vaccine  Aged Out       Subjective:     Review of Systems   Constitutional: Negative for activity change, appetite change, chills and fever. HENT: Positive for mouth sores. Negative for congestion, ear discharge, sinus pressure and sore throat. Cardiovascular: Negative. Gastrointestinal: Negative for abdominal pain, bowel incontinence, diarrhea, nausea and vomiting. Genitourinary: Positive for flank pain. Negative for bladder incontinence, dysuria, frequency and hematuria. Musculoskeletal: Negative for arthralgias and myalgias. Skin: Negative for rash. Neurological: Negative for paresthesias.       :Objective      Physical Exam  Vitals and nursing note reviewed. Constitutional:       General: He is awake. He is not in acute distress. Appearance: Normal appearance. He is well-developed, well-groomed and overweight. He is not ill-appearing. HENT:      Head: Normocephalic. Right Ear: Hearing normal.      Left Ear: Hearing normal.      Nose: Nose normal.      Mouth/Throat:      Lips: Pink. Mouth: Mucous membranes are moist. Oral lesions present. Pharynx: Oropharynx is clear. Uvula midline. Posterior oropharyngeal erythema present. Tonsils: 0 on the right. 0 on the left. Comments: Small red papule inside corner of mouth on the left side,erythema under tongue, no obvious lesion noted  Eyes:      General: Vision grossly intact.       Conjunctiva/sclera: Conjunctivae normal.   Neck:      Trachea: Phonation normal.   Cardiovascular:      Rate and Rhythm: Regular rhythm. Tachycardia present. Heart sounds: Normal heart sounds, S1 normal and S2 normal. No murmur heard. No friction rub. No gallop. Pulmonary:      Effort: Pulmonary effort is normal. No respiratory distress. Breath sounds: Normal breath sounds and air entry. No wheezing, rhonchi or rales. Comments: Pain with deep breathing  Chest:      Chest wall: No swelling, tenderness or edema. Abdominal:      General: Abdomen is protuberant. Bowel sounds are normal.      Palpations: Abdomen is soft. Tenderness: There is no right CVA tenderness, left CVA tenderness, guarding or rebound. Musculoskeletal:         General: No tenderness or deformity. Cervical back: Full passive range of motion without pain and neck supple. Thoracic back: No edema, deformity, signs of trauma or lacerations. Decreased range of motion. Back:       Comments: Extreme pain with lying flat and then trying to rise back up off exam table   Skin:     General: Skin is warm and dry. Capillary Refill: Capillary refill takes less than 2 seconds. Findings: Erythema present. Comments:  Overall the infection to left arm is slightly improved from Saturday, however he has a new abscess that is non-fluctuant to the left forearm area, area is red and tender to touch, approximately the size of a quarter, area is not draining   Neurological:      General: No focal deficit present. Mental Status: He is alert, oriented to person, place, and time and easily aroused. Psychiatric:         Attention and Perception: Attention normal.         Mood and Affect: Mood is anxious. Speech: Speech normal.         Behavior: Behavior normal. Behavior is cooperative. BP (!) 146/99   Pulse 109   Temp 98.3 °F (36.8 °C) (Temporal)   Ht 5' 10\" (1.778 m)   Wt 234 lb (106.1 kg)   SpO2 97%   BMI 33.58 kg/m²     :Assessment       Diagnosis Orders   1.  Lesion of mouth     2. Rib pain on left Instructions   Plenty of fluids  Rest  OTC Tylenol or Motrin as needed for pain  Zanaflex as directed at bedtime  Continue Bactrim as directed  Talk to pharmacist about medication for dry mouth  Keep appt on Thurs with your PCP          Patient given educational materials- see patient instructions. Discussed use, benefit, and side effects of prescribedmedications. All patient questions answered. Pt voiced understanding.        Electronically signed by CATHIE Lauren CNP on 8/10/2021 at 10:23 AM

## 2021-08-11 ENCOUNTER — NURSE TRIAGE (OUTPATIENT)
Dept: CALL CENTER | Facility: HOSPITAL | Age: 46
End: 2021-08-11

## 2021-08-12 ENCOUNTER — HOSPITAL ENCOUNTER (EMERGENCY)
Age: 46
Discharge: HOME | End: 2021-08-12
Payer: MEDICARE

## 2021-08-12 VITALS
TEMPERATURE: 98.3 F | RESPIRATION RATE: 18 BRPM | SYSTOLIC BLOOD PRESSURE: 141 MMHG | DIASTOLIC BLOOD PRESSURE: 102 MMHG | OXYGEN SATURATION: 97 % | HEART RATE: 94 BPM

## 2021-08-12 DIAGNOSIS — L02.91 ABSCESS: Primary | ICD-10-CM

## 2021-08-12 PROCEDURE — 99282 EMERGENCY DEPT VISIT SF MDM: CPT

## 2021-08-12 PROCEDURE — 10060 I&D ABSCESS SIMPLE/SINGLE: CPT

## 2021-08-12 RX ORDER — CEPHALEXIN 500 MG/1
500 CAPSULE ORAL 2 TIMES DAILY
Qty: 20 CAPSULE | Refills: 0 | Status: SHIPPED | OUTPATIENT
Start: 2021-08-12 | End: 2021-08-22

## 2021-08-12 ASSESSMENT — PAIN DESCRIPTION - ORIENTATION: ORIENTATION: LEFT

## 2021-08-12 ASSESSMENT — ENCOUNTER SYMPTOMS
CHEST TIGHTNESS: 0
NAUSEA: 0
SHORTNESS OF BREATH: 0
VOMITING: 0

## 2021-08-12 ASSESSMENT — PAIN DESCRIPTION - LOCATION: LOCATION: ARM

## 2021-08-12 ASSESSMENT — PAIN SCALES - GENERAL: PAINLEVEL_OUTOF10: 7

## 2021-08-12 NOTE — TELEPHONE ENCOUNTER
"    Reason for Disposition  • Requesting regular office appointment    Additional Information  • Negative: [1] Caller is not with the adult (patient) AND [2] reporting urgent symptoms  • Negative: Lab result questions  • Negative: Medication questions  • Negative: Caller can't be reached by phone  • Negative: Caller has already spoken to PCP or another triager  • Negative: RN needs further essential information from caller in order to complete triage  • Negative: [1] Caller requesting NON-URGENT health information AND [2] PCP's office is the best resource    Answer Assessment - Initial Assessment Questions  1. REASON FOR CALL or QUESTION: \"What is your reason for calling today?\" or \"How can I best help you?\" or \"What question do you have that I can help answer?\"      He has an appointment tomorrow with Dr. Rodriguez and is asking what time.  Advised I do not have that informaton and to call in the morning to find out.    Protocols used: INFORMATION ONLY CALL - NO TRIAGE-ADULT-      "

## 2021-08-12 NOTE — ED PROVIDER NOTES
for 10 days, Disp-20 tablet, R-0Normal      lamoTRIgine (LAMICTAL) 100 MG tablet 100 mg daily Historical Med      hydrOXYzine (ATARAX) 25 MG tablet TAKE 1 TABLET AS DIRECTED 1 TO 3 TIMES DAILYHistorical Med      JANUVIA 25 MG tablet TAKE 1 TABLET EVERY DAY, Disp-60 tablet, R-3Normal      ARIPiprazole (ABILIFY) 15 MG tablet TAKE 1 TABLET EVERY DAY, Disp-90 tablet,R-2Normal      atorvastatin (LIPITOR) 10 MG tablet TAKE 1 TABLET EVERY DAY, Disp-90 tablet,R-2Normal      metFORMIN (GLUCOPHAGE) 500 MG tablet TAKE 1 TABLET TWICE DAILY WITH MEALS, Disp-180 tablet,R-2Normal      lisinopril (PRINIVIL;ZESTRIL) 10 MG tablet TAKE 1 TABLET EVERY DAY, Disp-30 tablet, R-0Adjust Sig             ALLERGIES     Oxycodone-acetaminophen and Percocet [oxycodone-acetaminophen]    FAMILY HISTORY     No family history on file. SOCIAL HISTORY       Social History     Socioeconomic History    Marital status:      Spouse name: Not on file    Number of children: Not on file    Years of education: Not on file    Highest education level: Not on file   Occupational History    Not on file   Tobacco Use    Smoking status: Current Some Day Smoker     Types: Cigars    Smokeless tobacco: Never Used    Tobacco comment: patient does use a vape   Vaping Use    Vaping Use: Every day   Substance and Sexual Activity    Alcohol use: No     Alcohol/week: 0.0 standard drinks    Drug use: No    Sexual activity: Not on file   Other Topics Concern    Not on file   Social History Narrative    Not on file     Social Determinants of Health     Financial Resource Strain:     Difficulty of Paying Living Expenses:    Food Insecurity:     Worried About Running Out of Food in the Last Year:     Ran Out of Food in the Last Year:    Transportation Needs:     Lack of Transportation (Medical):      Lack of Transportation (Non-Medical):    Physical Activity:     Days of Exercise per Week:     Minutes of Exercise per Session:    Stress:     Feeling of Stress :    Social Connections:     Frequency of Communication with Friends and Family:     Frequency of Social Gatherings with Friends and Family:     Attends Mosque Services:     Active Member of Clubs or Organizations:     Attends Club or Organization Meetings:     Marital Status:    Intimate Partner Violence:     Fear of Current or Ex-Partner:     Emotionally Abused:     Physically Abused:     Sexually Abused:        SCREENINGS                        PHYSICAL EXAM    (up to 7 for level 4, 8 or more for level 5)     ED Triage Vitals [08/12/21 1111]   BP Temp Temp src Pulse Resp SpO2 Height Weight   (!) 141/102 98.3 °F (36.8 °C) -- 94 18 97 % -- --       Physical Exam  Vitals reviewed. Constitutional:       General: He is not in acute distress. Appearance: Normal appearance. He is not ill-appearing, toxic-appearing or diaphoretic. Cardiovascular:      Rate and Rhythm: Normal rate and regular rhythm. Pulses: Normal pulses. Heart sounds: Normal heart sounds. Pulmonary:      Effort: Pulmonary effort is normal.      Breath sounds: Normal breath sounds. Skin:     Comments: Left forearm with 3 separate lesions. Two proximal lesions with mild erythema but no warmth and no fluctuance. Distal most lesion is a 1 cm palpable abscess with erythema. No warmth. Neurological:      Mental Status: He is alert.          DIAGNOSTIC RESULTS     EKG: All EKG's are interpreted by the Emergency Department Physician who either signs or Co-signs this chart in the absence of a cardiologist.        RADIOLOGY:   Non-plain film images such as CT, Ultrasound and MRI are read by the radiologist. Plain radiographic images are visualized and preliminarily interpreted by the emergency physician with the below findings:        Interpretation per the Radiologist below, if available at the time of this note:    No orders to display         ED BEDSIDE ULTRASOUND:   Performed by ED Physician - times daily for 10 days, Disp-20 capsule, R-0Normal           Controlled Substances Monitoring:     No flowsheet data found.     (Please note that portions of this note were completed with a voice recognition program.  Efforts were made to edit the dictations but occasionally words are mis-transcribed.)    CATHIE Messer CNP (electronically signed)  Attending Emergency Physician         CATHIE Messer CNP  08/12/21 9804

## 2021-08-12 NOTE — ED NOTES
ASSESSMENT:    SKIN:  Warm, dry, pink. Cap refill < 2 sec  CARDIAC:  S1 S2 noted  LUNGS: clear upper and lower lobes. Respirations even and unlabored. ABDOMEN: bowel sounds noted upper and lower quadrants. Soft and tender. EXTREMITIES: bilateral DP and PT. No edema noted. Pt alert and oriented x4. Pupils equal and reactive. No distress noted. Side rails up and call light within reach.        Edward Sorensen RN  08/12/21 [id number]

## 2021-08-27 ENCOUNTER — HOSPITAL ENCOUNTER (EMERGENCY)
Facility: HOSPITAL | Age: 46
Discharge: HOME | End: 2021-08-27
Admitting: FAMILY MEDICINE

## 2021-08-27 ENCOUNTER — APPOINTMENT (OUTPATIENT)
Dept: CT IMAGING | Facility: HOSPITAL | Age: 46
End: 2021-08-27

## 2021-08-27 VITALS
BODY MASS INDEX: 35.79 KG/M2 | DIASTOLIC BLOOD PRESSURE: 97 MMHG | TEMPERATURE: 98.8 F | WEIGHT: 250 LBS | RESPIRATION RATE: 18 BRPM | OXYGEN SATURATION: 96 % | HEIGHT: 70 IN | HEART RATE: 91 BPM | SYSTOLIC BLOOD PRESSURE: 159 MMHG

## 2021-08-27 DIAGNOSIS — M54.9 ACUTE BACK PAIN, UNSPECIFIED BACK LOCATION, UNSPECIFIED BACK PAIN LATERALITY: ICD-10-CM
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
## 2021-08-27 DIAGNOSIS — S16.1XXA STRAIN OF NECK MUSCLE, INITIAL ENCOUNTER: ICD-10-CM

## 2021-08-27 PROCEDURE — 72131 CT LUMBAR SPINE W/O DYE: CPT

## 2021-08-27 PROCEDURE — 72128 CT CHEST SPINE W/O DYE: CPT

## 2021-08-27 PROCEDURE — 99282 EMERGENCY DEPT VISIT SF MDM: CPT

## 2021-08-27 PROCEDURE — 72125 CT NECK SPINE W/O DYE: CPT

## 2022-12-28 ENCOUNTER — HOSPITAL ENCOUNTER (INPATIENT)
Age: 47
LOS: 3 days | Discharge: HOME | End: 2022-12-31
Attending: PSYCHIATRY & NEUROLOGY | Admitting: PSYCHIATRY & NEUROLOGY
Payer: MEDICARE

## 2022-12-28 DIAGNOSIS — R45.851 SUICIDAL IDEATION: Primary | ICD-10-CM

## 2022-12-28 PROBLEM — F32.A DEPRESSION WITH SUICIDAL IDEATION: Status: ACTIVE | Noted: 2022-12-28

## 2022-12-28 LAB
ALBUMIN SERPL-MCNC: 4.5 G/DL (ref 3.5–5.2)
ALP BLD-CCNC: 96 U/L (ref 40–130)
ALT SERPL-CCNC: 20 U/L (ref 5–41)
AMPHETAMINE SCREEN, URINE: NEGATIVE
ANION GAP SERPL CALCULATED.3IONS-SCNC: 11 MMOL/L (ref 7–19)
AST SERPL-CCNC: 29 U/L (ref 5–40)
BARBITURATE SCREEN URINE: NEGATIVE
BASOPHILS ABSOLUTE: 0.1 K/UL (ref 0–0.2)
BASOPHILS RELATIVE PERCENT: 0.7 % (ref 0–1)
BENZODIAZEPINE SCREEN, URINE: NEGATIVE
BILIRUB SERPL-MCNC: 2.2 MG/DL (ref 0.2–1.2)
BUN BLDV-MCNC: 12 MG/DL (ref 6–20)
BUPRENORPHINE URINE: NEGATIVE
CALCIUM SERPL-MCNC: 9.1 MG/DL (ref 8.6–10)
CANNABINOID SCREEN URINE: POSITIVE
CHLORIDE BLD-SCNC: 100 MMOL/L (ref 98–111)
CO2: 25 MMOL/L (ref 22–29)
COCAINE METABOLITE SCREEN URINE: NEGATIVE
CREAT SERPL-MCNC: 0.6 MG/DL (ref 0.5–1.2)
EOSINOPHILS ABSOLUTE: 0.2 K/UL (ref 0–0.6)
EOSINOPHILS RELATIVE PERCENT: 1.5 % (ref 0–5)
ETHANOL: <10 MG/DL (ref 0–0.08)
GFR SERPL CREATININE-BSD FRML MDRD: >60 ML/MIN/{1.73_M2}
GLUCOSE BLD-MCNC: 96 MG/DL (ref 74–109)
HCT VFR BLD CALC: 42.6 % (ref 42–52)
HEMOGLOBIN: 14.1 G/DL (ref 14–18)
IMMATURE GRANULOCYTES #: 0 K/UL
LYMPHOCYTES ABSOLUTE: 1.8 K/UL (ref 1.1–4.5)
LYMPHOCYTES RELATIVE PERCENT: 17 % (ref 20–40)
Lab: ABNORMAL
MCH RBC QN AUTO: 30.7 PG (ref 27–31)
MCHC RBC AUTO-ENTMCNC: 33.1 G/DL (ref 33–37)
MCV RBC AUTO: 92.8 FL (ref 80–94)
METHADONE SCREEN, URINE: NEGATIVE
METHAMPHETAMINE, URINE: NEGATIVE
MONOCYTES ABSOLUTE: 0.9 K/UL (ref 0–0.9)
MONOCYTES RELATIVE PERCENT: 8.3 % (ref 0–10)
NEUTROPHILS ABSOLUTE: 7.7 K/UL (ref 1.5–7.5)
NEUTROPHILS RELATIVE PERCENT: 72.2 % (ref 50–65)
OPIATE SCREEN URINE: NEGATIVE
OXYCODONE URINE: NEGATIVE
PDW BLD-RTO: 13.8 % (ref 11.5–14.5)
PHENCYCLIDINE SCREEN URINE: NEGATIVE
PLATELET # BLD: 226 K/UL (ref 130–400)
PMV BLD AUTO: 11.3 FL (ref 9.4–12.4)
POTASSIUM REFLEX MAGNESIUM: 3.7 MMOL/L (ref 3.5–5)
PROPOXYPHENE SCREEN: NEGATIVE
RBC # BLD: 4.59 M/UL (ref 4.7–6.1)
SARS-COV-2, NAAT: NOT DETECTED
SODIUM BLD-SCNC: 136 MMOL/L (ref 136–145)
TOTAL PROTEIN: 7.3 G/DL (ref 6.6–8.7)
TRICYCLIC, URINE: NEGATIVE
WBC # BLD: 10.7 K/UL (ref 4.8–10.8)

## 2022-12-28 PROCEDURE — 6360000002 HC RX W HCPCS: Performed by: NURSE PRACTITIONER

## 2022-12-28 PROCEDURE — 80307 DRUG TEST PRSMV CHEM ANLYZR: CPT

## 2022-12-28 PROCEDURE — 80053 COMPREHEN METABOLIC PANEL: CPT

## 2022-12-28 PROCEDURE — 1240000000 HC EMOTIONAL WELLNESS R&B

## 2022-12-28 PROCEDURE — 80306 DRUG TEST PRSMV INSTRMNT: CPT

## 2022-12-28 PROCEDURE — 82077 ASSAY SPEC XCP UR&BREATH IA: CPT

## 2022-12-28 PROCEDURE — 96372 THER/PROPH/DIAG INJ SC/IM: CPT

## 2022-12-28 PROCEDURE — 87635 SARS-COV-2 COVID-19 AMP PRB: CPT

## 2022-12-28 PROCEDURE — 99285 EMERGENCY DEPT VISIT HI MDM: CPT

## 2022-12-28 PROCEDURE — 80175 DRUG SCREEN QUAN LAMOTRIGINE: CPT

## 2022-12-28 PROCEDURE — 85025 COMPLETE CBC W/AUTO DIFF WBC: CPT

## 2022-12-28 PROCEDURE — 36415 COLL VENOUS BLD VENIPUNCTURE: CPT

## 2022-12-28 RX ORDER — POLYETHYLENE GLYCOL 3350 17 G/17G
17 POWDER, FOR SOLUTION ORAL DAILY PRN
Status: DISCONTINUED | OUTPATIENT
Start: 2022-12-28 | End: 2022-12-31 | Stop reason: HOSPADM

## 2022-12-28 RX ORDER — LORAZEPAM 2 MG/ML
2 INJECTION INTRAMUSCULAR ONCE
Status: COMPLETED | OUTPATIENT
Start: 2022-12-28 | End: 2022-12-28

## 2022-12-28 RX ORDER — HALOPERIDOL 5 MG/ML
5 INJECTION INTRAMUSCULAR ONCE
Status: COMPLETED | OUTPATIENT
Start: 2022-12-28 | End: 2022-12-28

## 2022-12-28 RX ADMIN — HALOPERIDOL LACTATE 5 MG: 5 INJECTION, SOLUTION INTRAMUSCULAR at 21:48

## 2022-12-28 RX ADMIN — LORAZEPAM 2 MG: 2 INJECTION INTRAMUSCULAR; INTRAVENOUS at 21:48

## 2022-12-28 SDOH — ECONOMIC STABILITY: INCOME INSECURITY: IN THE LAST 12 MONTHS, WAS THERE A TIME WHEN YOU WERE NOT ABLE TO PAY THE MORTGAGE OR RENT ON TIME?: YES

## 2022-12-28 SDOH — ECONOMIC STABILITY: FOOD INSECURITY: WITHIN THE PAST 12 MONTHS, YOU WORRIED THAT YOUR FOOD WOULD RUN OUT BEFORE YOU GOT MONEY TO BUY MORE.: OFTEN TRUE

## 2022-12-28 SDOH — HEALTH STABILITY: PHYSICAL HEALTH: ON AVERAGE, HOW MANY MINUTES DO YOU ENGAGE IN EXERCISE AT THIS LEVEL?: 60 MIN

## 2022-12-28 SDOH — ECONOMIC STABILITY: TRANSPORTATION INSECURITY
IN THE PAST 12 MONTHS, HAS LACK OF TRANSPORTATION KEPT YOU FROM MEETINGS, WORK, OR FROM GETTING THINGS NEEDED FOR DAILY LIVING?: YES

## 2022-12-28 SDOH — ECONOMIC STABILITY: HOUSING INSECURITY: IN THE LAST 12 MONTHS, HOW MANY PLACES HAVE YOU LIVED?: 2

## 2022-12-28 SDOH — HEALTH STABILITY: PHYSICAL HEALTH: ON AVERAGE, HOW MANY DAYS PER WEEK DO YOU ENGAGE IN MODERATE TO STRENUOUS EXERCISE (LIKE A BRISK WALK)?: 5 DAYS

## 2022-12-28 SDOH — ECONOMIC STABILITY: FOOD INSECURITY: WITHIN THE PAST 12 MONTHS, THE FOOD YOU BOUGHT JUST DIDN'T LAST AND YOU DIDN'T HAVE MONEY TO GET MORE.: OFTEN TRUE

## 2022-12-28 SDOH — ECONOMIC STABILITY: HOUSING INSECURITY
IN THE LAST 12 MONTHS, WAS THERE A TIME WHEN YOU DID NOT HAVE A STEADY PLACE TO SLEEP OR SLEPT IN A SHELTER (INCLUDING NOW)?: YES

## 2022-12-28 SDOH — ECONOMIC STABILITY: TRANSPORTATION INSECURITY
IN THE PAST 12 MONTHS, HAS THE LACK OF TRANSPORTATION KEPT YOU FROM MEDICAL APPOINTMENTS OR FROM GETTING MEDICATIONS?: YES

## 2022-12-28 ASSESSMENT — PAIN DESCRIPTION - DESCRIPTORS: DESCRIPTORS: ACHING

## 2022-12-28 ASSESSMENT — SOCIAL DETERMINANTS OF HEALTH (SDOH)
DO YOU BELONG TO ANY CLUBS OR ORGANIZATIONS SUCH AS CHURCH GROUPS UNIONS, FRATERNAL OR ATHLETIC GROUPS, OR SCHOOL GROUPS?: NO
WITHIN THE LAST YEAR, HAVE YOU BEEN HUMILIATED OR EMOTIONALLY ABUSED IN OTHER WAYS BY YOUR PARTNER OR EX-PARTNER?: NO
HOW OFTEN DO YOU GET TOGETHER WITH FRIENDS OR RELATIVES?: NEVER
HOW HARD IS IT FOR YOU TO PAY FOR THE VERY BASICS LIKE FOOD, HOUSING, MEDICAL CARE, AND HEATING?: VERY HARD
HOW OFTEN DO YOU ATTEND CHURCH OR RELIGIOUS SERVICES?: NEVER
WITHIN THE LAST YEAR, HAVE YOU BEEN KICKED, HIT, SLAPPED, OR OTHERWISE PHYSICALLY HURT BY YOUR PARTNER OR EX-PARTNER?: NO
WITHIN THE LAST YEAR, HAVE YOU BEEN AFRAID OF YOUR PARTNER OR EX-PARTNER?: NO
HOW OFTEN DO YOU ATTENT MEETINGS OF THE CLUB OR ORGANIZATION YOU BELONG TO?: NEVER
IN A TYPICAL WEEK, HOW MANY TIMES DO YOU TALK ON THE PHONE WITH FAMILY, FRIENDS, OR NEIGHBORS?: NEVER
WITHIN THE LAST YEAR, HAVE TO BEEN RAPED OR FORCED TO HAVE ANY KIND OF SEXUAL ACTIVITY BY YOUR PARTNER OR EX-PARTNER?: NO

## 2022-12-28 ASSESSMENT — PATIENT HEALTH QUESTIONNAIRE - PHQ9
8. MOVING OR SPEAKING SO SLOWLY THAT OTHER PEOPLE COULD HAVE NOTICED. OR THE OPPOSITE, BEING SO FIGETY OR RESTLESS THAT YOU HAVE BEEN MOVING AROUND A LOT MORE THAN USUAL: MORE THAN HALF THE DAYS
7. TROUBLE CONCENTRATING ON THINGS, SUCH AS READING THE NEWSPAPER OR WATCHING TELEVISION: MORE THAN HALF THE DAYS
2. FEELING DOWN, DEPRESSED OR HOPELESS: NEARLY EVERY DAY
3. TROUBLE FALLING OR STAYING ASLEEP: MORE THAN HALF THE DAYS
SUM OF ALL RESPONSES TO PHQ9 QUESTIONS 1 & 2: 6
4. FEELING TIRED OR HAVING LITTLE ENERGY: MORE THAN HALF THE DAYS
9. THOUGHTS THAT YOU WOULD BE BETTER OFF DEAD, OR OF HURTING YOURSELF: NEARLY EVERY DAY
6. FEELING BAD ABOUT YOURSELF - OR THAT YOU ARE A FAILURE OR HAVE LET YOURSELF OR YOUR FAMILY DOWN: MORE THAN HALF THE DAYS
SUM OF ALL RESPONSES TO PHQ QUESTIONS 1-9: 19
10. IF YOU CHECKED OFF ANY PROBLEMS, HOW DIFFICULT HAVE THESE PROBLEMS MADE IT FOR YOU TO DO YOUR WORK, TAKE CARE OF THINGS AT HOME, OR GET ALONG WITH OTHER PEOPLE: EXTREMELY DIFFICULT
1. LITTLE INTEREST OR PLEASURE IN DOING THINGS: NEARLY EVERY DAY
5. POOR APPETITE OR OVEREATING: NOT AT ALL

## 2022-12-28 ASSESSMENT — PAIN DESCRIPTION - FREQUENCY: FREQUENCY: CONTINUOUS

## 2022-12-28 ASSESSMENT — PAIN DESCRIPTION - ORIENTATION: ORIENTATION: RIGHT

## 2022-12-28 ASSESSMENT — LIFESTYLE VARIABLES
HOW OFTEN DO YOU HAVE A DRINK CONTAINING ALCOHOL: NEVER
HOW OFTEN DO YOU HAVE A DRINK CONTAINING ALCOHOL: NEVER
HOW MANY STANDARD DRINKS CONTAINING ALCOHOL DO YOU HAVE ON A TYPICAL DAY: PATIENT DOES NOT DRINK
HOW MANY STANDARD DRINKS CONTAINING ALCOHOL DO YOU HAVE ON A TYPICAL DAY: PATIENT DOES NOT DRINK

## 2022-12-28 ASSESSMENT — SLEEP AND FATIGUE QUESTIONNAIRES
DO YOU USE A SLEEP AID: NO
DO YOU HAVE DIFFICULTY SLEEPING: YES
AVERAGE NUMBER OF SLEEP HOURS: 4
SLEEP PATTERN: DIFFICULTY FALLING ASLEEP;RESTLESSNESS

## 2022-12-28 ASSESSMENT — PAIN SCALES - GENERAL: PAINLEVEL_OUTOF10: 9

## 2022-12-28 ASSESSMENT — PAIN DESCRIPTION - LOCATION: LOCATION: SHOULDER;BACK

## 2022-12-28 ASSESSMENT — PAIN - FUNCTIONAL ASSESSMENT: PAIN_FUNCTIONAL_ASSESSMENT: 0-10

## 2022-12-28 NOTE — ED PROVIDER NOTES
[facility] EMERGENCY DEPT  EMERGENCY DEPARTMENT ENCOUNTER      Pt Name: Bay Cosby  MRN: [id number]  [place] 1975  Date of evaluation: 12/28/2022  Provider: CATHIE Narayanan CNP    CHIEF COMPLAINT       Chief Complaint   Patient presents with    Mental Health Problem     Pt from [facility], [facility], hx of ptsd. Pt states, \"I have no other options other than hurting myself. \"          HISTORY OF PRESENT ILLNESS   (Location/Symptom, Timing/Onset, Context/Setting, Quality, Duration, Modifying Factors, Severity)  Note limiting factors. Bay Cosby is a 49 y.o. male who presents to the emergency department from [facility]. He tells me he has had thoughts of harming himself. He states it is his only option. He has a plan, but does not communicate what it is to me. He reports stress of taking care of elderly father who had a stroke and daughter who is currently in [place], [place] with \"a friend\". He tells me he is taking Lamictal currently for his bipolar disorder but feels like it needs to be increased. HPI    Nursing Notes were reviewed. REVIEW OF SYSTEMS    (2-9 systems for level 4, 10 or more for level 5)     Review of Systems   Unable to perform ROS: Psychiatric disorder     Except as noted above the remainder of the review of systems was reviewed and negative.        PAST MEDICAL HISTORY     Past Medical History:   Diagnosis Date    Anxiety     Bipolar disorder ([address]     HTN (hypertension)     Hyperlipidemia          SURGICAL HISTORY       Past Surgical History:   Procedure Laterality Date    KNEE SURGERY      LAP BAND  placed 2009 with removal in [address] EXTRACTION           CURRENT MEDICATIONS       Previous Medications    ARIPIPRAZOLE (ABILIFY) 15 MG TABLET    TAKE 1 TABLET EVERY DAY    ATORVASTATIN (LIPITOR) 10 MG TABLET    TAKE 1 TABLET EVERY DAY    HYDROXYZINE (ATARAX) 25 MG TABLET    TAKE 1 TABLET AS DIRECTED 1 TO 3 TIMES DAILY    JANUVIA 25 MG TABLET    TAKE 1 TABLET EVERY DAY    LAMOTRIGINE (LAMICTAL) 100 MG TABLET    100 mg daily     LISINOPRIL (PRINIVIL;ZESTRIL) 10 MG TABLET    TAKE 1 TABLET EVERY DAY    METFORMIN (GLUCOPHAGE) 500 MG TABLET    TAKE 1 TABLET TWICE DAILY WITH MEALS    TIZANIDINE (ZANAFLEX) 4 MG TABLET    Take 1 tablet by mouth nightly as needed (muscle spasm)       ALLERGIES     Oxycodone-acetaminophen and Percocet [oxycodone-acetaminophen]    FAMILY HISTORY     History reviewed. No pertinent family history. SOCIAL HISTORY       Social History     Socioeconomic History    Marital status:      Spouse name: None    Number of children: None    Years of education: None    Highest education level: None   Tobacco Use    Smoking status: Some Days     Types: Cigars    Smokeless tobacco: Never    Tobacco comments:     patient does use a vape   Vaping Use    Vaping Use: Every day   Substance and Sexual Activity    Alcohol use: No     Alcohol/week: 0.0 standard drinks    Drug use: No       SCREENINGS         [place] Coma Scale  Eye Opening: Spontaneous  Best Verbal Response: Oriented  Best Motor Response: Obeys commands  [place] Coma Scale Score: 15                     CIWA Assessment  BP: 123/75  Heart Rate: 85                 PHYSICAL EXAM    (up to 7 for level 4, 8 or more for level 5)     ED Triage Vitals [12/28/22 1659]   BP Temp Temp Source Heart Rate Resp SpO2 Height Weight   123/75 98.4 °F (36.9 °C) Oral 85 18 97 % 5' 10\" (1.778 m) 165 lb (74.8 kg)       Physical Exam  Vitals reviewed. Constitutional:       General: He is not in acute distress. Appearance: Normal appearance. He is not ill-appearing, toxic-appearing or diaphoretic. HENT:      Head: Normocephalic and atraumatic. Mouth/Throat:      Mouth: Mucous membranes are moist.      Pharynx: Oropharynx is clear. Eyes:      Extraocular Movements: Extraocular movements intact.       Conjunctiva/sclera: Conjunctivae normal. Pupils: Pupils are equal, round, and reactive to light. Cardiovascular:      Rate and Rhythm: Normal rate and regular rhythm. Pulses: Normal pulses. Heart sounds: Normal heart sounds. Pulmonary:      Effort: Pulmonary effort is normal.   Abdominal:      General: Bowel sounds are normal. There is no distension. Palpations: Abdomen is soft. Tenderness: There is no abdominal tenderness. There is no right CVA tenderness, left CVA tenderness or guarding. Musculoskeletal:         General: Normal range of motion. Cervical back: Neck supple. No tenderness. Skin:     General: Skin is warm and dry. Capillary Refill: Capillary refill takes less than 2 seconds. Neurological:      General: No focal deficit present. Mental Status: He is alert and oriented to person, place, and time. Psychiatric:         Attention and Perception: He is inattentive. Mood and Affect: Mood is anxious. Affect is labile. Speech: Speech is rapid and pressured. Behavior: Behavior is agitated. Thought Content: Thought content includes suicidal ideation. Thought content includes suicidal plan.        DIAGNOSTIC RESULTS     EKG: All EKG's are interpreted by the Emergency Department Physician who either signs or Co-signs this chart in the absence of a cardiologist.        RADIOLOGY:   Non-plain film images such as CT, Ultrasound and MRI are read by the radiologist. Plain radiographic images are visualized and preliminarily interpreted by the emergency physician with the below findings:        Interpretation per the Radiologist below, if available at the time of this note:    No orders to display         ED BEDSIDE ULTRASOUND:   Performed by ED Physician - none    LABS:  Labs Reviewed   CBC WITH AUTO DIFFERENTIAL - Abnormal; Notable for the following components:       Result Value    RBC 4.59 (*)     Neutrophils % 72.2 (*)     Lymphocytes % 17.0 (*)     Neutrophils Absolute 7.7 (*) All other components within normal limits   COMPREHENSIVE METABOLIC PANEL W/ REFLEX TO MG FOR LOW K - Abnormal; Notable for the following components: Total Bilirubin 2.2 (*)     All other components within normal limits   DRUG SCRN, BUPRENORPHINE - Abnormal; Notable for the following components:    Cannabinoid Scrn, Ur POSITIVE (*)     All other components within normal limits   COVID-19, RAPID   ETHANOL   LAMOTRIGINE LEVEL   DRUG SCRN, BUPRENORPHINE       All other labs were within normal range or not returned as of this dictation. EMERGENCY DEPARTMENT COURSE and DIFFERENTIAL DIAGNOSIS/MDM:   Vitals:    Vitals:    12/28/22 1659   BP: 123/75   Pulse: 85   Resp: 18   Temp: 98.4 °F (36.9 °C)   TempSrc: Oral   SpO2: 97%   Weight: 165 lb (74.8 kg)   Height: 5' 10\" (1.778 m)           MDM        REASSESSMENT     ED Course as of 12/28/22 2126   Wed Dec 28, 2022   2119 Reviewed with Brenton Thao [facility] AN AFFILIATE OF [facility], plans for admission to Dr Jose Dasilva. [BW]      ED Course User Index  [BW] CATHIE Cuello CNP         CRITICAL CARE TIME       CONSULTS:  None    PROCEDURES:  Unless otherwise noted below, none     Procedures         FINAL IMPRESSION      1. Suicidal ideation          DISPOSITION/PLAN   DISPOSITION Decision To Admit 12/28/2022 09:20:06 PM      PATIENT REFERRED TO:  No follow-up provider specified. DISCHARGE MEDICATIONS:  New Prescriptions    No medications on file     Controlled Substances Monitoring:     No flowsheet data found.     (Please note that portions of this note were completed with a voice recognition program.  Efforts were made to edit the dictations but occasionally words are mis-transcribed.)    CATHIE Chun CNP (electronically signed)  Attending Emergency Physician          CATHIE Chun CNP  12/28/22 2126

## 2022-12-29 PROBLEM — F39 UNSPECIFIED MOOD (AFFECTIVE) DISORDER (HCC): Status: ACTIVE | Noted: 2022-12-29

## 2022-12-29 PROCEDURE — 6360000002 HC RX W HCPCS: Performed by: PSYCHIATRY & NEUROLOGY

## 2022-12-29 PROCEDURE — 1240000000 HC EMOTIONAL WELLNESS R&B

## 2022-12-29 PROCEDURE — 6370000000 HC RX 637 (ALT 250 FOR IP): Performed by: PSYCHIATRY & NEUROLOGY

## 2022-12-29 PROCEDURE — 6370000000 HC RX 637 (ALT 250 FOR IP): Performed by: SURGERY

## 2022-12-29 RX ORDER — DIVALPROEX SODIUM 500 MG/1
1000 TABLET, EXTENDED RELEASE ORAL DAILY
Status: DISCONTINUED | OUTPATIENT
Start: 2022-12-29 | End: 2022-12-31 | Stop reason: HOSPADM

## 2022-12-29 RX ORDER — LORAZEPAM 2 MG/ML
2 INJECTION INTRAMUSCULAR EVERY 6 HOURS PRN
Status: DISCONTINUED | OUTPATIENT
Start: 2022-12-29 | End: 2022-12-31 | Stop reason: HOSPADM

## 2022-12-29 RX ORDER — RISPERIDONE 1 MG/1
1 TABLET ORAL NIGHTLY
Status: DISCONTINUED | OUTPATIENT
Start: 2022-12-29 | End: 2022-12-31 | Stop reason: HOSPADM

## 2022-12-29 RX ORDER — TRAZODONE HYDROCHLORIDE 50 MG/1
50 TABLET ORAL NIGHTLY PRN
Status: DISCONTINUED | OUTPATIENT
Start: 2022-12-29 | End: 2022-12-31 | Stop reason: HOSPADM

## 2022-12-29 RX ORDER — DOXYCYCLINE HYCLATE 100 MG/1
100 CAPSULE ORAL EVERY 12 HOURS SCHEDULED
Status: DISCONTINUED | OUTPATIENT
Start: 2022-12-29 | End: 2022-12-31 | Stop reason: HOSPADM

## 2022-12-29 RX ORDER — HALOPERIDOL 5 MG/ML
5 INJECTION INTRAMUSCULAR EVERY 6 HOURS PRN
Status: DISCONTINUED | OUTPATIENT
Start: 2022-12-29 | End: 2022-12-31 | Stop reason: HOSPADM

## 2022-12-29 RX ADMIN — DIVALPROEX SODIUM 1000 MG: 500 TABLET, EXTENDED RELEASE ORAL at 09:54

## 2022-12-29 RX ADMIN — LORAZEPAM 2 MG: 2 INJECTION INTRAMUSCULAR; INTRAVENOUS at 09:54

## 2022-12-29 RX ADMIN — RISPERIDONE 1 MG: 1 TABLET ORAL at 21:46

## 2022-12-29 RX ADMIN — TRAZODONE HYDROCHLORIDE 50 MG: 50 TABLET ORAL at 21:46

## 2022-12-29 RX ADMIN — DOXYCYCLINE HYCLATE 100 MG: 100 CAPSULE ORAL at 21:46

## 2022-12-29 RX ADMIN — HALOPERIDOL LACTATE 5 MG: 5 INJECTION, SOLUTION INTRAMUSCULAR at 09:54

## 2022-12-29 ASSESSMENT — PATIENT HEALTH QUESTIONNAIRE - PHQ9: SUM OF ALL RESPONSES TO PHQ QUESTIONS 1-9: 25
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
## 2022-12-29 NOTE — PROGRESS NOTES
BOB attempted to complete the psychosocial/CSSRS assessments and the OQ Questionnaire with the pt, but was unable to assess him after he was given a shot to calm down because he had began getting agitated with the staff earlier.

## 2022-12-29 NOTE — PROGRESS NOTES
Dr. Chloe Burns at bedside to assess abscess on pt's coccyx.      Electronically signed by Briseiad Santana RN on 12/29/2022 at 2:45 PM
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
## 2022-12-29 NOTE — PROGRESS NOTES
[facility] Admission Note  Nursing Admission Note        Reason for Admission: Lolis Wade is a 49 y.o. male who presents to the emergency department from [facility]. He states that he has had thoughts of harming himself. He states it is his only option. He has a plan, but does not communicate what it is. He reports stress of taking care of elderly father who had a stroke and daughter who is currently in [place], [facility] [address] with \"a friend\". He says that he is taking Lamictal currently for his bipolar disorder but feels like it needs to be increased. Pt. has no history of suicide attempts. He has had multiple psychiatric admissions in multiple states. Pt. is homeless. Pt. presents manic with racing thoughts and rapid, pressured speech. Pt. has flight of ideas and reports multiple medical conditions that he is focused on. Pt. denies HI and AVH.     Patient Active Problem List   Diagnosis    Mood disorder ([facility] [address] [address]    Abdominal wall bulge    Testicle lump    Hypertension    Diabetes mellitus type II, controlled ([facility] [address]    Encounter to establish care    Depression with suicidal ideation         Addictive Behavior:   Addictive Behavior  In the Past 3 Months, Have You Felt or Has Someone Told You That You Have a Problem With  : None    Medical Problems:   Past Medical History:   Diagnosis Date    Anxiety     Bipolar disorder ([facility] [address]     HTN (hypertension)     Hyperlipidemia        Status EXAM:  Mental Status and Behavioral Exam  Normal: No  Level of Assistance: Independent/Self  Facial Expression: Avoids Gaze, Worried, Flat  Affect: Congruent  Level of Consciousness: Alert  Frequency of Checks: 4 times per hour, close  Mood:Normal: No  Mood: Depressed, Anxious, Helpless (rates depression 10 and anxiety 10)  Motor Activity:Normal: No (pt reports increased activity because he has no transportation or place to live and he has to move all of his belongings)  Motor Activity: Increased  Eye Contact: Fair  Observed Behavior: Cooperative, Guarded  Sexual Misconduct History: Current - no  Preception: [place] to person, [place] to situation, [place] to place, [place] to time  Thought Processes: Flight of ideas  Thought Content:Normal: No  Thought Content: Preoccupations, Ideas of influence  Depression Symptoms: Change in energy level, Impaired concentration, Feelings of hopelessess, Increased irritability, Sleep disturbance  Anxiety Symptoms: Generalized, Feelings of doom, Unexplained fears  Maame Symptoms: Flight of ideas, Pressured speech, Poor judgment  Hallucinations: None  Delusions: Yes  Delusions: Influence, Controlled  Memory:Normal: No  Memory: Poor recent, Poor remote  Insight and Judgment: No  Insight and Judgment: Poor judgment, Poor insight    Psych:   Suicidal Ideation: yes. If yes, is there a plan? (Describe) NA              Risk of Suicide: Moderate Risk   Homicidal Ideation:  no.  If yes, describe: NA   Auditory/Visual Hallucinations:  no.      If yes, describe AVH? N    Metabolic Screening:  Lab Results   Component Value Date    LABA1C 5.9 02/17/2020     Lab Results   Component Value Date    CHOL 105 (L) 11/04/2019    CHOL 121 (L) 04/18/2019    CHOL 150 (L) 04/16/2018    CHOL 124 (L) 10/16/2017    CHOL 136 (L) 01/09/2017    CHOL 119 (L) 05/16/2016     Lab Results   Component Value Date    TRIG 40 11/04/2019    TRIG 59 04/18/2019    TRIG 153 (H) 04/16/2018    TRIG 129 10/16/2017    TRIG 85 (L) 01/09/2017    TRIG 103 (L) 05/16/2016     Lab Results   Component Value Date    HDL 41 (L) 11/04/2019    HDL 40 (L) 04/18/2019    HDL 36 (L) 04/16/2018    HDL 33 (L) 10/16/2017    HDL 33 (L) 01/09/2017    HDL 28 (L) 05/16/2016     No components found for: LDLCAL  No results found for: LABVLDL  Body mass index is 23.47 kg/m².   BP Readings from Last 2 Encounters:   12/28/22 (!) 111/56   08/12/21 (!) 141/102       PATIENT STRENGTHS and Barriers:   Patient Strengths/Barriers  Strengths (Must Choose Two): Sense of humor, Previous positive response to treatment  Barriers: Support from family, Independent living      Tobacco Screening:  Practical Counseling, on admission, joselin X, if applicable and completed (first 3 are required if patient doesn't refuse):            Recognizing danger situations (included triggers and roadblocks)   NA              Coping skills (new ways to manage stress, exercise, relaxation techniques, changing routine, distraction  NA                                                    Basic information about quitting (benefits of quitting, techniques in how to quit, available resources NA  Referral for counseling faxed to [facility]     NA                                       Patient refused counseling yes  Patient has not smoked in the last 30 days NA  Patient offered nicotine patch. Received Yes  Refused NA  Patient is a non-smoker NA       Admission to Unit:    Pt admitted to [facility] under the care of Dr. Poppy Esquivel,  arrived on unit via [facility] with security and staff from ED    Patient arrived dressed in paper scrubs:  yes. Body assessment and safety check completed by Yue Tinsley RN and Nathan Peralta RN and  no contraband discovered. Patient belongings and valuables was cataloged and accounted for by [facility], RN. Admission completed by Nathan Peralta RN and Yue Tinsley RN  Oriented to unit, unit policy and expectations:  yes    Reviewed and explained all legal documents:  yes    Education for [place] and Restraints given: yes    Patient signed all legal documents yes   Pt verbalizes understanding:yes     Anahy Morale Obtained? yes    Medical Bed:  Does patient require a medical bed? no  If answered yes for medical bed use, does the patient have the following conditions? High risk for falls? no   Obstructive sleep apnea? no   Oxygen Use? no   Use of assistive devices? no   Other, (explain)? NA      Identifies stressors. yes   Family, Financial, Bipolar medications, and homeless.       Protective Factors:  Patient identifies protective factors with nursing staff as follows: Identifies reasons for living: Yes   Supportive Social Network or family: No    Belief that suicide is immoral/high spirituality: No   Responsibility to family or others/living with family: Yes   Fear of death or dying due to pain and suffering: No   Engaged in work or school: No     If Patient is unable to identify, reason why? NA          Admission Note:    Patient was given Haldol and Ativan in the ED before arrival.  Patient is calm and cooperative. Patient is much less manic and requesting to eat and drink. Patient is given food and drink, then retires to bed. Patient is sleeping soundly at this time.           Electronically signed by Feli Lugo RN on 12/29/22 at 12:43 AM CST Satisfactory

## 2022-12-29 NOTE — PROGRESS NOTES
Group Note    Date: 12/29/22  Start Time: 8:00 AM   End Time:8:30 AM     Number of Participants: 7    Type of Group: Community/Goal     Patient's Goal:      Notes:  patient didn't attend group today    Status After Intervention:  Improved    Participation Level:  Active Listener    Participation Quality: Appropriate    Speech:  normal    Thought Process/Content: Logical    Mood:  calm    Level of consciousness:  Alert    Response to Learning: Able to verbalize current knowledge/experience    Modes of Intervention: Education and Support    Discipline Responsible: Behavioral Health Technician     Signature:  Martha Rodriguez

## 2022-12-29 NOTE — PLAN OF CARE
Problem: Self Harm/Suicidality  Goal: Will have no self-injury during hospital stay  Description: INTERVENTIONS:  1. Ensure constant observer at bedside with Q15M safety checks  2. Maintain a safe environment  3. Secure patient belongings  4. Ensure family/visitors adhere to safety recommendations  5. Ensure safety tray has been added to patient's diet order  6. Every shift and PRN: Re-assess suicidal risk via Frequent Screener    Outcome: Progressing     Problem: Chronic Conditions and Co-morbidities  Goal: Patient's chronic conditions and co-morbidity symptoms are monitored and maintained or improved  Outcome: Progressing     Problem: Pain  Goal: Verbalizes/displays adequate comfort level or baseline comfort level  Outcome: Progressing     Problem: Depression  Goal: Will be euthymic at discharge  Description: INTERVENTIONS:  1. Administer medication as ordered  2. Provide emotional support via 1:1 interaction with staff  3. Encourage involvement in milieu/groups/activities  4. Monitor for social isolation  Outcome: Progressing     Problem: Maame  Goal: Will exhibit normal sleep and speech and no impulsivity  Description: INTERVENTIONS:  1. Administer medication as ordered  2. Set limits on impulsive behavior  3. Make attempts to decrease external stimuli as possible  Outcome: Progressing     Problem: Anxiety  Goal: Will report anxiety at manageable levels  Description: INTERVENTIONS:  1. Administer medication as ordered  2. Teach and rehearse alternative coping skills  3. Provide emotional support with 1:1 interaction with staff  Outcome: Progressing     Problem: Sleep Disturbance  Goal: Will exhibit normal sleeping pattern  Description: INTERVENTIONS:  1. Administer medication as ordered  2. Decrease environmental stimuli, including noise, as appropriate  3.  Discourage social isolation and naps during the day  Outcome: Progressing     Problem: Nutrition Deficit:  Goal: Optimize nutritional status  Outcome: Progressing

## 2022-12-29 NOTE — PROGRESS NOTES
[facility] Adult Unit Daily Assessment  Nursing Progress Note    Room: [facility]-   Name: Michael Butler   Age: 49 y.o. Gender: male   Dx: Depression with suicidal ideation  Precautions: suicide risk  Inpatient Status: voluntary       SLEEP:  Sleep Quality  JOSE sedated at this time due to PRN Haldol and Ativan IM at 0955  Sleep Medications: Yes   PRN Sleep Meds: Yes       MEDICAL:  Other PRN Meds: Yes, PRN Haldol 5 mg and Ativan 2 mg IM   Med Compliant: Yes  Accu-Chek: No  Oxygen/CPAP/BiPAP: No  CIWA/CINA: No   PAIN Assessment: abscess on coccyx  Side Effects from medication: No      Metabolic Screening:  Lab Results   Component Value Date    LABA1C 5.9 02/17/2020     Lab Results   Component Value Date    CHOL 105 (L) 11/04/2019    CHOL 121 (L) 04/18/2019    CHOL 150 (L) 04/16/2018    CHOL 124 (L) 10/16/2017    CHOL 136 (L) 01/09/2017    CHOL 119 (L) 05/16/2016     Lab Results   Component Value Date    TRIG 40 11/04/2019    TRIG 59 04/18/2019    TRIG 153 (H) 04/16/2018    TRIG 129 10/16/2017    TRIG 85 (L) 01/09/2017    TRIG 103 (L) 05/16/2016     Lab Results   Component Value Date    HDL 41 (L) 11/04/2019    HDL 40 (L) 04/18/2019    HDL 36 (L) 04/16/2018    HDL 33 (L) 10/16/2017    HDL 33 (L) 01/09/2017    HDL 28 (L) 05/16/2016     No components found for: LDLCAL  No results found for: LABVLDL  Body mass index is 23.47 kg/m². BP Readings from Last 2 Encounters:   12/29/22 121/78   08/12/21 (!) 141/102         Medical Bed:   Is patient in a medical bed? no   If medical bed is in use, has nursing secured room while patient is awake and out of the room? Has safety checks by nursing been completed on the bed/room this shift? Protective Factors:  Patient identifies protective factors with nursing staff as follows:    Identifies reasons for living: JOSE sedated at this time due to PRN Haldol and Ativan IM at [facility] [address] or family: JOSE sedated at this time due to PRN Haldol and Ativan IM at 7724 Belief that suicide is immoral/high spirituality: JOSE sedated at this time due to PRN Haldol and Ativan IM at 6242   Responsibility to family or others/living with family: JOSE sedated at this time due to PRN Haldol and Ativan IM at 0955   Fear of death or dying due to pain and suffering: JOSE sedated at this time due to PRN Haldol and Ativan IM at [address] in work or school: JOSE sedated at this time due to PRN Haldol and Ativan IM at 1691     If Patient is unable to identify, reason why? PSYCH:  Depression: JOSE sedated at this time due to PRN Haldol and Ativan IM at 0955   Anxiety: JOSE sedated at this time due to PRN Haldol and Ativan IM at 0955   SI JOSE sedated at this time due to PRN Haldol and Ativan IM at 0955      HI JOSE sedated at this time due to PRN Haldol and Ativan IM at 0955   AVH: JOSE sedated at this time due to PRN Haldol and Ativan IM at 0955 If Hallucinations are present, describe? GENERAL:  Appetite: good   Percent Meals: %   Social: No   Speech: pressured   Appearance: appropriately dressed and appropriately groomed    GROUP:  Group Participation: No  Participation Quality:     Notes: Pt is sedated at this time due to PRN Haldol and Ativan IM at 0955. Unable to assess. Will continue to monitor pt.      Electronically signed by Edyta Bahena RN on 12/29/22 at 3:17 PM CST

## 2022-12-29 NOTE — PROGRESS NOTES
Treatment Team Note:    Target Symptoms/Reason for admission: Per nursing admission assessment - Reason for Admission: Albin Willoughby is a 49 y.o. male who presents to the emergency department from [facility]. He states that he has had thoughts of harming himself. He states it is his only option. He has a plan, but does not communicate what it is. He reports stress of taking care of elderly father who had a stroke and daughter who is currently in [place], [place] with \"a friend\". He says that he is taking Lamictal currently for his bipolar disorder but feels like it needs to be increased. Pt. has no history of suicide attempts. He has had multiple psychiatric admissions in multiple states. Pt. is homeless. Pt. presents manic with racing thoughts and rapid, pressured speech. Pt. has flight of ideas and reports multiple medical conditions that he is focused on. Pt. denies HI and AVH. Diagnoses per psych provider: Suicidal ideation [R45.851]  Depression with suicidal ideation [F32. Grisel Rosas    Therapist met with treatment team to discuss patients treatment and discharge plans.     Patient's aftercare plan is: SW will meet with patient to gather information    Aftercare appointments made: No - SW will make discharge appointments    Pt lives with: SW will meet with patient to gather information    Collateral obtained from: refused  Collateral obtained on:refused    Attending groups: New admission - SW will monitor group attendance    Behavior: calm    Has patient been completing ADL's:  New admission - unknown at this time - SW will monitor    SI:  patient denies SI  Plan: no   If yes describe: N/A - patient denies plan  HI:  patient denies HI  If present describe: N/A  Delusions: patient denies delusions  If present describe: N/A  Hallucinations: patient denies hallucinations  If present describe: N/A    Patient rates their -- Depression: 1-10:  0  Anxiety:1-10:  0    Sleeping:Yes    Taking medication: Yes    Misc:

## 2022-12-29 NOTE — PLAN OF CARE
Nutrition Problem #1: Inadequate oral intake  Intervention: Food and/or Nutrient Delivery: Continue Current Diet  Nutritional

## 2022-12-29 NOTE — PROGRESS NOTES
Comprehensive Nutrition Assessment    Type and Reason for Visit:  Initial, Positive Nutrition Screen    Nutrition Recommendations/Plan:   Continue current POC, sending larger portions when able     Malnutrition Assessment:  Malnutrition Status:  At risk for malnutrition (Comment) (12/29/22 0906)    Context:  Chronic Illness     Findings of the 6 clinical characteristics of malnutrition:  Energy Intake:  Mild decrease in energy intake (Comment)  Weight Loss:  Greater than 20% over 1 year     Body Fat Loss:  Mild body fat loss Orbital, Buccal region   Muscle Mass Loss:  No significant muscle mass loss    Fluid Accumulation:  No significant fluid accumulation Extremities   Strength:  Not Performed    Nutrition Assessment:    +NS for weight loss.  Pt has had a 76# weight loss in 1 year--or 31.8% UBW.  Pt still in normal BMI range.  PO intake is good with intake ranging %.  Will send larger portion sizes to help with weight.    Nutrition Related Findings:      Wound Type: None (abrasion and scratches)       Current Nutrition Intake & Therapies:    Average Meal Intake: %  Average Supplements Intake: None Ordered  ADULT DIET; Regular    Anthropometric Measures:  Height: 5' 10\" (177.8 cm)  Ideal Body Weight (IBW): 166 lbs (75 kg)    Admission Body Weight: 163 lb 9.6 oz (74.2 kg)  Current Body Weight: 163 lb 9.6 oz (74.2 kg), 98.6 % IBW.    Current BMI (kg/m2): 23.5  Usual Body Weight: 240 lb (108.9 kg) (12/2021.    180#  8/2000   175# 9/2022)  % Weight Change (Calculated): -31.8  BMI Categories: Normal Weight (BMI 18.5-24.9)    Estimated Daily Nutrient Needs:  Energy Requirements Based On: Kcal/kg  Weight Used for Energy Requirements: Current  Energy (kcal/day): 8297-0361 kcals (25-30 kcals/kg)  Weight Used for Protein Requirements: Current  Protein (g/day): 74-149g  Method Used for Fluid Requirements: 1 ml/kcal  Fluid (ml/day): 4531-7011 ml    Nutrition Diagnosis:   Inadequate oral intake related to  psychological cause or life stress, catabolic illness as evidenced by weight loss, weight loss greater than or equal to 20% in 1 year    Nutrition Interventions:   Food and/or Nutrient Delivery: Continue Current Diet  Nutrition Education/Counseling: No recommendation at this time  Coordination of Nutrition Care: Continue to monitor while inpatient       Goals:     Goals: Meet at least 75% of estimated needs, PO intake 50% or greater       Nutrition Monitoring and Evaluation:   Behavioral-Environmental Outcomes: None Identified  Food/Nutrient Intake Outcomes: Food and Nutrient Intake  Physical Signs/Symptoms Outcomes: Biochemical Data, Fluid Status or Edema, Weight, Skin    Discharge Planning:    Continue current diet     Julio Monterroso MS, RD, LD  Contact: [phone]

## 2022-12-29 NOTE — PROGRESS NOTES
This staff present during interview in treatment team room with psychiatrist. Pt is manic, with pressured speech and tangential thought processes. Disorganized. Verbal order received from Dr. Yoan Birmingham to administer Haldol 5 mg IM PRN and Ativan 2 mg IM PRN. Administered injection without complications or complaints. Pt tolerated well. Pt stated \"no matter how many of these shots you give me, my reactions will be the same with that communication\". Will continue to monitor pt.      Electronically signed by Tarik Quinones RN on 12/29/2022 at 10:11 AM

## 2022-12-29 NOTE — CONSULTS
[facility]    Patient ID: Michael Bude  49 y.o.  male  YOB: 1975    Admitting Diagnosis: Suicidal ideation [R45.851]  Depression with suicidal ideation [F32. A, R45.851]  Unspecified mood (affective) disorder ([facility] [address] [F39]    Chief Complaint:  Chief Complaint   Patient presents with    Mental Health Problem     Pt from [facility] psych, hx of ptsd. Pt states, \"I have no other options other than hurting myself. \"        Subjective:    Mr. Sean Joseph is a 49 y.o. male who presents today with discomfort and swelling in his coccyx. Patient states he had a pilonidal cyst cut out many years ago. Has noted that he has an area enlarged and swollen at his coccyx that is been present for a few days. Denies fevers or chills. Denies drainage. Patient is currently in the behavioral health floor.           Past Medical History:   Diagnosis Date    Anxiety     Bipolar disorder ([facility] [address]     HTN (hypertension)     Hyperlipidemia      Past Surgical History:   Procedure Laterality Date    KNEE SURGERY      LAP BAND  placed 2009 with removal in [facility]       Current Facility-Administered Medications   Medication Dose Route Frequency Provider Last Rate Last Admin    haloperidol lactate (HALDOL) injection 5 mg  5 mg IntraMUSCular Q6H PRN Teddy Da Silva MD   5 mg at 12/29/22 0954    LORazepam (ATIVAN) injection 2 mg  2 mg IntraMUSCular Q6H PRN Teddy Da Silva MD   2 mg at 12/29/22 0954    divalproex (DEPAKOTE ER) extended release tablet 1,000 mg  1,000 mg Oral Daily Teddy Da Silva MD   1,000 mg at 12/29/22 0954    traZODone (DESYREL) tablet 50 mg  50 mg Oral Nightly PRN Teddy Da Silva MD        risperiDONE (RISPERDAL) tablet 1 mg  1 mg Oral Nightly Teddy Da Silva MD        doxycycline hyclate (VIBRA-TABS) tablet 100 mg  100 mg Oral 2 times per day Darrel Clinton DO        polyethylene glycol (GLYCOLAX) packet 17 g  17 g Oral Daily PRN Ana Fisher MD         Allergies: Oxycodone-acetaminophen and Percocet [oxycodone-acetaminophen]    History reviewed. No pertinent family history. Social History     Tobacco Use    Smoking status: Some Days     Types: Cigars    Smokeless tobacco: Never    Tobacco comments:     patient does use a vape   Substance Use Topics    Alcohol use: No     Alcohol/week: 0.0 standard drinks       Review of Systems   Unable to perform ROS: Psychiatric disorder     Objective:   /78   Pulse 67   Temp 97.3 °F (36.3 °C) (Temporal)   Resp 16   Ht 5' 10\" (1.778 m)   Wt 163 lb 9.6 oz (74.2 kg)   SpO2 98%   BMI 23.47 kg/m²   No intake or output data in the 24 hours ending 12/29/22 1511  Physical Exam  Vitals reviewed. Constitutional:       Appearance: Normal appearance. HENT:      Head: Normocephalic. Right Ear: External ear normal.      Left Ear: External ear normal.   Pulmonary:      Effort: Pulmonary effort is normal.   Abdominal:      General: Abdomen is flat. Skin:     General: Skin is warm and dry. Neurological:      Mental Status: He is alert. Data:  CBC:   Recent Labs     12/28/22  1715   WBC 10.7   RBC 4.59*   HGB 14.1   HCT 42.6   MCV 92.8   RDW 13.8        BMP:   Recent Labs     12/28/22  1715      K 3.7      CO2 25   ANIONGAP 11   GLUCOSE 96   CREATININE 0.6   LABGLOM >60   CALCIUM 9.1     LFT:   Recent Labs     12/28/22  1715   PROT 7.3   LABALBU 4.5   BILITOT 2.2*   ALKPHOS 96   ALT 20   AST 29     PT/INR:No results for input(s): PROTIME, INR in the last 72 hours. Assessment:     Principal Problem:    Depression with suicidal ideation  Active Problems:    Unspecified mood (affective) disorder (HCC)  Resolved Problems:    * No resolved hospital problems.  *      Plan:     Doxycycline BID x 10 days  Warm compresses QID for 20 minutes to the affected area  No I&D indicated at this time  Follow peripherally       Thank you for your consult    Electronically signed by Hermelinda Baires DO on 12/29/2022 at 3:11 PM

## 2022-12-29 NOTE — PROGRESS NOTES
Spoke with [facility] [place] @ [phone] and they verified patient has [place] as secondary insurance. ID is #[id number] and patient has Medicare A/B as primary. They advise they will  deductibles and copays that Medicare doesn't cover. They fall behind Medicare and if Medicare A/B doesn't require authorization, they would not either.

## 2022-12-29 NOTE — PROGRESS NOTES
Admission Note      Reason for admission/Target Symptom: Per nursing admission assessment - Reason for Admission: Mauro Cosme is a 49 y.o. male who presents to the emergency department from [address] behavioral health. He states that he has had thoughts of harming himself. He states it is his only option. He has a plan, but does not communicate what it is. He reports stress of taking care of elderly father who had a stroke and daughter who is currently in [place], [place] with \"a friend\". He says that he is taking Lamictal currently for his bipolar disorder but feels like it needs to be increased. Pt. has no history of suicide attempts. He has had multiple psychiatric admissions in multiple states. Pt. is homeless. Pt. presents manic with racing thoughts and rapid, pressured speech. Pt. has flight of ideas and reports multiple medical conditions that he is focused on. Pt. denies HI and AVH. Diagnoses: Depression NOS  UDS: Cannabinoid   BAL:  Neg    SW will meet with treatment team to discuss patient's treatment including care planning, discharge planning, and follow-up needs. Patient has been admitted to [facility] the [facility]. Treatment team will identify the patient's discharge needs. Appointments will be made for medication management and outpatient therapy/counseling. Pt confirmed the need for ongoing treatment post inpatient stay. Pt was also provided a handout of contact information for drug and alcohol treatment centers and other community support service such as [facility], AA, and [facility].

## 2022-12-29 NOTE — PROGRESS NOTES
Behavioral Services  Medicare Certification Upon Admission    I certify that this patient's inpatient psychiatric hospital admission is medically necessary for:    [x] (1) Treatment which could reasonably be expected to improve this patient's condition,       [x] (2) Or for diagnostic study;     AND     [x](2) The inpatient psychiatric services are provided while the individual is under the care of a physician and are included in the individualized plan of care.     Estimated length of stay/service 5-7 days    Plan for post-hospital care TBD    Electronically signed by Natali Russell MD on 12/29/2022 at 10:15 AM

## 2022-12-30 VITALS
HEIGHT: 70 IN | TEMPERATURE: 97.5 F | WEIGHT: 163.6 LBS | DIASTOLIC BLOOD PRESSURE: 75 MMHG | BODY MASS INDEX: 23.42 KG/M2 | RESPIRATION RATE: 18 BRPM | OXYGEN SATURATION: 98 % | HEART RATE: 83 BPM | SYSTOLIC BLOOD PRESSURE: 116 MMHG

## 2022-12-30 LAB
LAMOTRIGINE LEVEL: 1.2 UG/ML (ref 3–15)
TSH REFLEX FT4: 1.58 UIU/ML (ref 0.35–5.5)
VITAMIN B-12: 410 PG/ML (ref 211–946)
VITAMIN D 25-HYDROXY: 39 NG/ML

## 2022-12-30 PROCEDURE — 6370000000 HC RX 637 (ALT 250 FOR IP): Performed by: PSYCHIATRY & NEUROLOGY

## 2022-12-30 PROCEDURE — 84443 ASSAY THYROID STIM HORMONE: CPT

## 2022-12-30 PROCEDURE — 36415 COLL VENOUS BLD VENIPUNCTURE: CPT

## 2022-12-30 PROCEDURE — 82306 VITAMIN D 25 HYDROXY: CPT

## 2022-12-30 PROCEDURE — 1240000000 HC EMOTIONAL WELLNESS R&B

## 2022-12-30 PROCEDURE — 6370000000 HC RX 637 (ALT 250 FOR IP): Performed by: SURGERY

## 2022-12-30 PROCEDURE — 6360000002 HC RX W HCPCS: Performed by: PSYCHIATRY & NEUROLOGY

## 2022-12-30 PROCEDURE — 82607 VITAMIN B-12: CPT

## 2022-12-30 RX ORDER — NICOTINE 21 MG/24HR
1 PATCH, TRANSDERMAL 24 HOURS TRANSDERMAL DAILY
Status: DISCONTINUED | OUTPATIENT
Start: 2022-12-30 | End: 2022-12-31 | Stop reason: HOSPADM

## 2022-12-30 RX ADMIN — DIVALPROEX SODIUM 1000 MG: 500 TABLET, EXTENDED RELEASE ORAL at 08:28

## 2022-12-30 RX ADMIN — LORAZEPAM 2 MG: 2 INJECTION INTRAMUSCULAR; INTRAVENOUS at 11:51

## 2022-12-30 RX ADMIN — HALOPERIDOL LACTATE 5 MG: 5 INJECTION, SOLUTION INTRAMUSCULAR at 11:51

## 2022-12-30 RX ADMIN — RISPERIDONE 1 MG: 1 TABLET ORAL at 21:35

## 2022-12-30 RX ADMIN — DOXYCYCLINE HYCLATE 100 MG: 100 CAPSULE ORAL at 08:28

## 2022-12-30 RX ADMIN — DOXYCYCLINE HYCLATE 100 MG: 100 CAPSULE ORAL at 21:34

## 2022-12-30 RX ADMIN — TRAZODONE HYDROCHLORIDE 50 MG: 50 TABLET ORAL at 21:35

## 2022-12-30 NOTE — PROGRESS NOTES
Group Note    Date: 12/30/22  Start Time: 8:00 AM   End Time:8:30 AM     Number of Participants: 5    Type of Group: Community/Goal     Patient's Goal:      Notes:  patient didn't attend group today    Status After Intervention:  Improved    Participation Level:  Active Listener    Participation Quality: Appropriate    Speech:  normal    Thought Process/Content: Logical    Mood:  calm    Level of consciousness:  Alert    Response to Learning: Able to verbalize current knowledge/experience    Modes of Intervention: Education and Support    Discipline Responsible: Behavioral Health Technician     Signature:  Chino Zhang

## 2022-12-30 NOTE — GROUP NOTE
Group Therapy Note    Date: 12/29/2022    Group Start Time: 1600  Group End Time: 1700  Group Topic: Healthy Living/Wellness    [facility] ADULT I    Colonel Jorge Alberto RN            Patient's Goal:  Safety Plan     Status After Intervention:  Unchanged    Participation Level: Minimal    Participation Quality: Resistant      Speech:  normal      Thought Process/Content: Logical      Affective Functioning: Congruent      Mood: anxious      Level of consciousness:  Alert and Oriented x4      Response to Learning: Progressing to goal      Endings: None Reported    Modes of Intervention: Education and Support      Discipline Responsible: Registered Nurse      Signature:   Colonel Jorge Alberto RN

## 2022-12-30 NOTE — PROGRESS NOTES
Robyn placed a call to the [facility] [place] [place] and spoke with Reginald Lipscomb and she said that she has no idea of if the patient came in with a dog or the whereabouts of his dog

## 2022-12-30 NOTE — H&P
HISTORY and PHYSICAL      CHIEF COMPLAINT:  Maame, SI    Reason for Admission:  Maame,  SI    History Obtained From:  patient, chart    HISTORY OF PRESENT ILLNESS:      The patient is a 49 y.o. male who is admitted to the [place] [address] unit with worsening mood issues. He has no c/o CP or SOA. He has no abdominal pain or N/V. He has no new pain complaints. He has a boil/cyst on his coccyx area. No fever. Past Medical History:        Diagnosis Date    Anxiety     Bipolar disorder ([address]     HTN (hypertension)     Hyperlipidemia      Past Surgical History:        Procedure Laterality Date    KNEE SURGERY      LAP BAND  placed 2009 with removal in 2011    PILONIDAL CYST EXCISION      WISDOM TOOTH EXTRACTION           Medications Prior to Admission:    Medications Prior to Admission: tiZANidine (ZANAFLEX) 4 MG tablet, Take 1 tablet by mouth nightly as needed (muscle spasm) (Patient not taking: Reported on 12/28/2022)  lamoTRIgine (LAMICTAL) 25 MG tablet, 25 mg daily Take 25 mg once daily for 2 weeks, then increase to 2 tablets (50 mg) once daily  hydrOXYzine (ATARAX) 25 MG tablet, TAKE 1 TABLET AS DIRECTED 1 TO 3 TIMES DAILY (Patient not taking: Reported on 12/28/2022)  JANUVIA 25 MG tablet, TAKE 1 TABLET EVERY DAY (Patient not taking: No sig reported)  ARIPiprazole (ABILIFY) 15 MG tablet, TAKE 1 TABLET EVERY DAY (Patient not taking: No sig reported)  atorvastatin (LIPITOR) 10 MG tablet, TAKE 1 TABLET EVERY DAY (Patient not taking: No sig reported)  metFORMIN (GLUCOPHAGE) 500 MG tablet, TAKE 1 TABLET TWICE DAILY WITH MEALS (Patient not taking: No sig reported)  lisinopril (PRINIVIL;ZESTRIL) 10 MG tablet, TAKE 1 TABLET EVERY DAY (Patient not taking: No sig reported)    Allergies:  Oxycodone-acetaminophen and Percocet [oxycodone-acetaminophen]    Social History:   TOBACCO:   reports that he has been smoking cigars.  He has never used smokeless tobacco.  ETOH:   reports no history of alcohol use.  DRUGS:   reports no history of drug use.        Family History:   History reviewed. No pertinent family history.  REVIEW OF SYSTEMS:  Constitutional: neg  CV: neg  Pulmonary: neg  GI: neg  : neg  Psych: jessica, SI  Neuro: neg  Skin: neg  MusculoSkeletal: neg  HEENT: neg  Joints: neg    Vitals:  /69   Pulse 72   Temp 96.8 °F (36 °C) (Temporal)   Resp 18   Ht 5' 10\" (1.778 m)   Wt 163 lb 9.6 oz (74.2 kg)   SpO2 98%   BMI 23.47 kg/m²     PHYSICAL EXAM:  Gen: NAD, alert  HEENT: WNL  Lymph: no LAD  Neck: no JVD or masses  Chest: CTA bilat  CV: RRR  Abdomen: NT/ND  Extrem: no C/C/E  Neuro: non focal  Skin: no rashes  Joints: no redness    DATA:  I have reviewed the admission labs and imaging tests.    ASSESSMENT AND PLAN:      Principal Problem:    Depression with suicidal ideation---follow with Psych    HTN--follow BP    THC Use    Abscess/Cyst on his Coccyx--ask Surgery to evaluate, ? Need for I & D    HPL        Marla Lopez MD  8:54 PM 12/29/2022

## 2022-12-30 NOTE — PROGRESS NOTES
Department of Psychiatry  Attending Progress Note      SUBJECTIVE:    49 y.o. homeless male with previous psychiatric history of bipolar disorder, who has been admitted to our psychiatric unit secondary to treatment noncompliance, manic-like episode and suicidal ideations. Patient has been seen in treatment team room with presence of the patient's nurse. Patient reported that his condition significantly improved since time of admission to the hospital, after he was able to sleep most of the day yesterday and during the last night. Patient reported that he is still feels \"very tired\" today. He endorses a good appetite and improved quality of sleep. Patient is compliant with currently prescribed medications and denies any side effects. He continues to report feeling of anxiety and depression, and rated both of them as 6 out of 10, with 10 being the worst, stated that he is worrying about his \"service dog\", as he does not know where is his dog right now. Patient did not attend any group activities in the unit since time of admission to the hospital.  He is not social with medical staff or other patients in the unit. He did not perform his ADLs today yet. Patient denies current active suicidal or homicidal ideations, denies any plans. Also, he denies auditory and visual hallucinations. Patient did not endorse any delusions or paranoid thoughts today. Patient has been seen and evaluated by surgeon Dr. Jonathan Todd regarding pilonidal cyst.  No recommendations of I&D. Recommended doxycycline 100 mg twice daily for 10 days as well as warm compresses 4 times a day for 20 minutes to the affected area. OBJECTIVE    Physical  VITALS:  /69   Pulse 72   Temp 96.8 °F (36 °C) (Temporal)   Resp 18   Ht 5' 10\" (1.778 m)   Wt 163 lb 9.6 oz (74.2 kg)   SpO2 98%   BMI 23.47 kg/m²   TEMPERATURE:  Current - Temp: 96.8 °F (36 °C);  Max - Temp  Av.8 °F (36 °C)  Min: 96.8 °F (36 °C)  Max: 96.8 °F (36 °C)  RESPIRATIONS RANGE: Resp  Av  Min: 18  Max: 18  PULSE RANGE: Pulse  Av.5  Min: 79  Max: 72  BLOOD PRESSURE RANGE:  Systolic (01VSR), IWI:338 , Min:110 , MHP:295  ; Diastolic (04HWQ), JXE:24, Min:69, Max:78   PULSE OXIMETRY RANGE: SpO2  Av %  Min: 98 %  Max: 98 %    Review of Systems: 14 point review of systems is negative    Psychological ROS: Positive for presence of anxiety and depression    Mental Status Examination:    Appearance: Appropriately groomed and in hospital attire. Made limited eye contact. Behavior: Anxious, restless, partially cooperative with interview. Mild psychomotor agitation appreciated. Gait unremarkable. Speech: Slightly increased in tone, hyperverbal, mildly pressured speech noted,  Mood: \"Very tired\"   Affect: Mood congruent. Range is mildly intense  Thought Process: Mostly circumstantial, sometimes tangential  Thought Content: Patient does not have any current active suicidal and homicidal ideations. No overt delusions or paranoia appreciated. Perceptions: Seems patient does not have any auditory or visual hallucinations at present time. Patient did not appear to be responding to internal stimuli. Orientation: to person, place, and situation. Alert.    Impulsivity: Limited  Neurovegitative: Good appetite, improved sleep  Insight: Limited  Judgment: Limited       Data    Lab Results   Component Value Date    MSUZMHHA16 410 2022     Lab Results   Component Value Date    VITD25 39.0 2022        Medications  Current Facility-Administered Medications: haloperidol lactate (HALDOL) injection 5 mg, 5 mg, IntraMUSCular, Q6H PRN  LORazepam (ATIVAN) injection 2 mg, 2 mg, IntraMUSCular, Q6H PRN  divalproex (DEPAKOTE ER) extended release tablet 1,000 mg, 1,000 mg, Oral, Daily  traZODone (DESYREL) tablet 50 mg, 50 mg, Oral, Nightly PRN  risperiDONE (RISPERDAL) tablet 1 mg, 1 mg, Oral, Nightly  doxycycline hyclate (VIBRAMYCIN) capsule 100 mg, 100 mg, Oral, 2 times per day  polyethylene glycol (GLYCOLAX) packet 17 g, 17 g, Oral, Daily PRN    ASSESSMENT AND PLAN    DSM 5 DIAGNOSIS:  Mood disorder, unspecified  History of bipolar disorder  Cannabis use disorder  Treatment noncompliance  Homelessness      Plan:   1. Psychiatric Medications:   Continue current psychotropic medications as recommended. Patient denies side effect of currently prescribed medications. No changes to the dose of prescribed psychotropic medications today. 2. Medical Issues:    Continue medical monitoring by Dr. Phuc Osborn and associates. 3. Disposition:    Discharge patient from the hospital when he will be in stable mental health condition and adjustment psychotropic medications will be done.      Amount of time spent with patient:    35 minutes with greater than 50% of the time spent in counseling and collaboration of care

## 2022-12-30 NOTE — PLAN OF CARE
Group Therapy Note     Date: 12/30/2022  Start Time: 1100  End Time:  2832  Number of Participants: 6     Type of Group: Psychoeducation     Wellness Binder Information  Module Name:  emotional wellness  Session Number:  1     Patient's Goal:  validation of feelings     Notes:  pt was verbally prompted to attend group. Pt refused. Information about emotional wellness was provided. Status After Intervention:       Participation Level:      Participation Quality:         Speech:           Thought Process/Content:         Affective Functioning:         Mood:         Level of consciousness:          Response to Learning:         Endings:      Modes of Intervention:         Discipline Responsible: Psychoeducational Specialist        Signature:  Alcides Way

## 2022-12-30 NOTE — PROGRESS NOTES
SW met with pt to complete psychosocial, CSSRS,OQ evaluation. Due to symptoms of psychosis pt was unable to assess.

## 2022-12-30 NOTE — PROGRESS NOTES
[facility] Adult Unit Daily Assessment  Nursing Progress Note    Room: [facility]   Name: Savanah Martinez   Age: 49 y.o. Gender: male   Dx: Depression with suicidal ideation  Precautions: suicide risk  Inpatient Status: voluntary       SLEEP:  Sleep Quality Good  Sleep Medications: No  PRN Sleep Meds: Yes; trazadone 50mg      MEDICAL:  Other PRN Meds: No   Med Compliant: Yes  Accu-Chek: No  Oxygen/CPAP/BiPAP: No  CIWA/CINA: No   PAIN Assessment: denies  Side Effects from medication: none voiced      Metabolic Screening:  Lab Results   Component Value Date    LABA1C 5.9 02/17/2020     Lab Results   Component Value Date    CHOL 105 (L) 11/04/2019    CHOL 121 (L) 04/18/2019    CHOL 150 (L) 04/16/2018    CHOL 124 (L) 10/16/2017    CHOL 136 (L) 01/09/2017    CHOL 119 (L) 05/16/2016     Lab Results   Component Value Date    TRIG 40 11/04/2019    TRIG 59 04/18/2019    TRIG 153 (H) 04/16/2018    TRIG 129 10/16/2017    TRIG 85 (L) 01/09/2017    TRIG 103 (L) 05/16/2016     Lab Results   Component Value Date    HDL 41 (L) 11/04/2019    HDL 40 (L) 04/18/2019    HDL 36 (L) 04/16/2018    HDL 33 (L) 10/16/2017    HDL 33 (L) 01/09/2017    HDL 28 (L) 05/16/2016     No components found for: LDLCAL  No results found for: LABVLDL  Body mass index is 23.47 kg/m². BP Readings from Last 2 Encounters:   12/29/22 110/69   08/12/21 (!) 141/102         Medical Bed:   Is patient in a medical bed? no   If medical bed is in use, has nursing secured room while patient is awake and out of the room? NA  Has safety checks by nursing been completed on the bed/room this shift? yes    Protective Factors:  Patient identifies protective factors with nursing staff as follows:    Identifies reasons for living: Yes   Supportive Social Network or family: No    Belief that suicide is immoral/high spirituality: Yes   Responsibility to family or others/living with family: No   Fear of death or dying due to pain and suffering: No   Engaged in work or school: No     If Patient is unable to identify, reason why? N/A      PSYCH:  Depression: 9   Anxiety: 7   SI denies suicidal ideation   Risk of Suicide: No Risk  HI Negative for homicidal ideation      AVH:denies If Hallucinations are present, describe? N/A        GENERAL:  Appetite: good   Percent Meals: no meals consumed during this shift   Social: No   Speech: normal   Appearance: appropriately dressed    GROUP:  Group Participation: No  Participation Quality: None    Notes: Pt was seen in the hallway [facility]. He is not social. He does not appear to be responding to internal stimuli. He rates depression a 9 and anxiety a 7 and denies SI, HI and AVH. Pt able to answer questions appropriately tonight. He has good sleep and appetite and was med complaint. Pt has been resting quietly since going to bed. Will continue to monitor for safety as ordered.          Electronically signed by Ronald Gupta RN on 12/30/22 at 2:39 AM CST

## 2022-12-30 NOTE — PROGRESS NOTES
Treatment Team Note:     Target Symptoms/Reason for admission: Per nursing admission assessment - Reason for Admission: Colette Murphy is a 49 y.o. male who presents to the emergency department from [facility]. He states that he has had thoughts of harming himself. He states it is his only option. He has a plan, but does not communicate what it is. He reports stress of taking care of elderly father who had a stroke and daughter who is currently in [place], [place] with \"a friend\". He says that he is taking Lamictal currently for his bipolar disorder but feels like it needs to be increased. Pt. has no history of suicide attempts. He has had multiple psychiatric admissions in multiple states. Pt. is homeless. Pt. presents manic with racing thoughts and rapid, pressured speech. Pt. has flight of ideas and reports multiple medical conditions that he is focused on. Pt. denies HI and AVH. Diagnoses per psych provider: Suicidal ideation [R45.851]  Depression with suicidal ideation [F32. Martha Quails     Therapist met with treatment team to discuss patients treatment and discharge plans.      Patient's aftercare plan is: SW will meet with patient to gather information     Aftercare appointments made: No - SW will make discharge appointments     Pt lives with: SW will meet with patient to gather information     Collateral obtained from: refused  Collateral obtained on:refused     Attending groups: New admission - SW will monitor group attendance     Behavior: calm     Has patient been completing ADL's:  New admission - unknown at this time - SW will monitor     SI:  patient denies SI  Plan: no   If yes describe: N/A - patient denies plan  HI:  patient denies HI  If present describe: N/A  Delusions: patient denies delusions  If present describe: N/A  Hallucinations: patient denies hallucinations  If present describe: N/A     Patient rates their -- Depression: 1-10:  0  Anxiety:1-10:  0 Sleeping:Yes     Taking medication: Yes     Misc:

## 2022-12-30 NOTE — PROGRESS NOTES
[facility] Adult Unit Daily Assessment  Nursing Progress Note    Room: [facility]-01   Name: Brando Tidwell   Age: 49 y.o. Gender: male   Dx: Depression with suicidal ideation  Precautions: close watch and suicide risk  Inpatient Status: voluntary       SLEEP:  Sleep Quality Good  Sleep Medications: Yes   PRN Sleep Meds: Yes       MEDICAL:  Other PRN Meds: Yes   Med Compliant: Yes  Accu-Chek: No  Oxygen/CPAP/BiPAP: No  CIWA/CINA: No   PAIN Assessment: none  Side Effects from medication: No      Metabolic Screening:  Lab Results   Component Value Date    LABA1C 5.9 02/17/2020     Lab Results   Component Value Date    CHOL 105 (L) 11/04/2019    CHOL 121 (L) 04/18/2019    CHOL 150 (L) 04/16/2018    CHOL 124 (L) 10/16/2017    CHOL 136 (L) 01/09/2017    CHOL 119 (L) 05/16/2016     Lab Results   Component Value Date    TRIG 40 11/04/2019    TRIG 59 04/18/2019    TRIG 153 (H) 04/16/2018    TRIG 129 10/16/2017    TRIG 85 (L) 01/09/2017    TRIG 103 (L) 05/16/2016     Lab Results   Component Value Date    HDL 41 (L) 11/04/2019    HDL 40 (L) 04/18/2019    HDL 36 (L) 04/16/2018    HDL 33 (L) 10/16/2017    HDL 33 (L) 01/09/2017    HDL 28 (L) 05/16/2016     No components found for: LDLCAL  No results found for: LABVLDL  Body mass index is 23.47 kg/m². BP Readings from Last 2 Encounters:   12/30/22 110/84   08/12/21 (!) 141/102         Medical Bed:   Is patient in a medical bed? NA   If medical bed is in use, has nursing secured room while patient is awake and out of the room? NA  Has safety checks by nursing been completed on the bed/room this shift? NA    Protective Factors:  Patient identifies protective factors with nursing staff as follows:    Identifies reasons for living: Yes   Supportive Social Network or family: No    Belief that suicide is immoral/high spirituality: Yes   Responsibility to family or others/living with family: Yes   Fear of death or dying due to pain and suffering: Yes   Engaged in work or school: No     If Patient is unable to identify, reason why? PSYCH:  Depression: 6   Anxiety: 6   SI denies suicidal ideation   Risk of Suicide: No Risk  HI Negative for homicidal ideation      AVH:no If Hallucinations are present, describe? GENERAL:  Appetite: no change from normal   Percent Meals: 100%   Social: Yes   Speech: pressured   Appearance: appropriately dressed and healthy looking    GROUP:  Group Participation: No  Participation Quality: None    Notes: Patient is highly anxious this morning. Anxiety is related to the care of his service dog, which was taken to a a local kennel to be cared for while the patient is here. Patient asked me to contact the kennel, which I did. The kennel told us the dog was doing well and this helped the patient to relax some. Patient received Haldol and Ativan IM and he then went to his room and took a nap. Patient is med compliant and cooperative. Patient states he told ER that he was suicidal just to have a place to stay, but this perhaps is a bit incongruent considering the stressors he has spoken of. Patient is presently calm.          Electronically signed by Siva Jules RN on 12/30/22 at 1:17 PM CST

## 2022-12-30 NOTE — PROGRESS NOTES
Patient around 1130 was getting very anxious and agitated. Haldol and Ativan Im given. Patient calm and cooperative presently.

## 2022-12-30 NOTE — PLAN OF CARE
Problem: Self Harm/Suicidality  Goal: Will have no self-injury during hospital stay  Description: INTERVENTIONS:  1. Ensure constant observer at bedside with Q15M safety checks  2. Maintain a safe environment  3. Secure patient belongings  4. Ensure family/visitors adhere to safety recommendations  5. Ensure safety tray has been added to patient's diet order  6. Every shift and PRN: Re-assess suicidal risk via Frequent Screener    Outcome: Progressing     Problem: Depression  Goal: Will be euthymic at discharge  Description: INTERVENTIONS:  1. Administer medication as ordered  2. Provide emotional support via 1:1 interaction with staff  3. Encourage involvement in milieu/groups/activities  4. Monitor for social isolation  Outcome: Progressing     Problem: Maame  Goal: Will exhibit normal sleep and speech and no impulsivity  Description: INTERVENTIONS:  1. Administer medication as ordered  2. Set limits on impulsive behavior  3.  Make attempts to decrease external stimuli as possible  Outcome: Progressing

## 2022-12-31 ENCOUNTER — HOSPITAL ENCOUNTER (EMERGENCY)
Age: 47
Discharge: HOME | End: 2022-12-31
Attending: EMERGENCY MEDICINE
Payer: MEDICARE

## 2022-12-31 VITALS
OXYGEN SATURATION: 98 % | TEMPERATURE: 98.7 F | WEIGHT: 165 LBS | HEIGHT: 70 IN | RESPIRATION RATE: 20 BRPM | DIASTOLIC BLOOD PRESSURE: 100 MMHG | SYSTOLIC BLOOD PRESSURE: 138 MMHG | HEART RATE: 82 BPM | BODY MASS INDEX: 23.62 KG/M2

## 2022-12-31 DIAGNOSIS — Z76.0 ENCOUNTER FOR MEDICATION REFILL: Primary | ICD-10-CM

## 2022-12-31 PROCEDURE — 6370000000 HC RX 637 (ALT 250 FOR IP): Performed by: PSYCHIATRY & NEUROLOGY

## 2022-12-31 PROCEDURE — 6370000000 HC RX 637 (ALT 250 FOR IP): Performed by: SURGERY

## 2022-12-31 PROCEDURE — 99283 EMERGENCY DEPT VISIT LOW MDM: CPT

## 2022-12-31 PROCEDURE — 5130000000 HC BRIDGE APPOINTMENT

## 2022-12-31 RX ORDER — TRAZODONE HYDROCHLORIDE 50 MG/1
50 TABLET ORAL NIGHTLY PRN
Qty: 30 TABLET | Refills: 0 | Status: SHIPPED | OUTPATIENT
Start: 2022-12-31

## 2022-12-31 RX ORDER — RISPERIDONE 1 MG/1
1 TABLET ORAL NIGHTLY
Qty: 30 TABLET | Refills: 0 | Status: SHIPPED | OUTPATIENT
Start: 2022-12-31

## 2022-12-31 RX ORDER — DOXYCYCLINE HYCLATE 100 MG/1
100 CAPSULE ORAL EVERY 12 HOURS SCHEDULED
Qty: 17 CAPSULE | Refills: 0 | Status: SHIPPED | OUTPATIENT
Start: 2022-12-31 | End: 2023-01-09

## 2022-12-31 RX ORDER — DIVALPROEX SODIUM 500 MG/1
1000 TABLET, EXTENDED RELEASE ORAL DAILY
Qty: 60 TABLET | Refills: 0 | Status: SHIPPED | OUTPATIENT
Start: 2022-12-31

## 2022-12-31 RX ADMIN — DIVALPROEX SODIUM 1000 MG: 500 TABLET, EXTENDED RELEASE ORAL at 08:03

## 2022-12-31 RX ADMIN — DOXYCYCLINE HYCLATE 100 MG: 100 CAPSULE ORAL at 08:02

## 2022-12-31 ASSESSMENT — PAIN - FUNCTIONAL ASSESSMENT: PAIN_FUNCTIONAL_ASSESSMENT: NONE - DENIES PAIN

## 2022-12-31 NOTE — PROGRESS NOTES
Progress Note  Villa Berry  12/30/2022 9:52 PM  Subjective:   Admit Date:   12/28/2022      CC/ADMIT DX:       Interval History:   Reviewed overnight events and nursing notes. He has no new medical complaints. I have reviewed all labs/diagnostics from the last 24hrs. ROS:   I have done a 10 point ROS and all are negative, except what is mentioned in the HPI. ADULT DIET; Regular    Medications:      nicotine  1 patch TransDERmal Daily    divalproex  1,000 mg Oral Daily    risperiDONE  1 mg Oral Nightly    doxycycline hyclate  100 mg Oral 2 times per day           Objective:   Vitals: /75   Pulse 83   Temp 97.5 °F (36.4 °C) (Temporal)   Resp 18   Ht 5' 10\" (1.778 m)   Wt 163 lb 9.6 oz (74.2 kg)   SpO2 98%   BMI 23.47 kg/m²  No intake or output data in the 24 hours ending 12/30/22 2152  General appearance: alert and cooperative with exam  Extremities: extremities normal, atraumatic, no cyanosis or edema  Neurologic:  No obvious focal neurologic deficits. Skin: no rashes    Assessment and Plan:   Principal Problem:    Depression with suicidal ideation  Active Problems:    Unspecified mood (affective) disorder (HCC)  Resolved Problems:    * No resolved hospital problems. *    Pilonidal Cyst    Plan:   Continue present medication(s)    Follow with Psych   Treat with PO antibiotics and warm compresses      Discharge planning:  home     Reviewed treatment plans with the patient and/or family.              Electronically signed by Itzel Stout MD on 12/30/2022 at 9:52 PM

## 2022-12-31 NOTE — ED PROVIDER NOTES
[facility] EMERGENCY DEPT  eMERGENCY dEPARTMENT eNCOUnter      Pt Name: Destinee Schaefer  MRN: [id number]  [place] 1975  Date of evaluation: 12/31/2022  Provider: Citlali Berumen MD    [address] [place] [address]       Chief Complaint   Patient presents with    Medication Refill     Pt arrives to ED via EMS needing a refill on medication. Pt states he has not been able to get his medications since he left this am. Pt denies SI/HI         HISTORY OF PRESENT ILLNESS   (Location/Symptom, Timing/Onset,Context/Setting, Quality, Duration, Modifying Factors, Severity)  Note limiting factors. Destinee Schaefer is a 49 y.o. male who presents to the emergency department medication refill. The patient was just discharged from the psychiatric floor and says he lost his paper prescriptions. He needs all of his medications filled. He cannot pay to have the prescriptions filled if the RX were to be sent to the pharmacy    HPI    NursingNotes were reviewed. REVIEW OF SYSTEMS    (2-9 systems for level 4, 10 or more for level 5)     Review of Systems   Constitutional:  Negative for chills and fever. HENT:  Negative for rhinorrhea and sore throat. Respiratory:  Negative for shortness of breath. Cardiovascular:  Negative for chest pain and leg swelling. Gastrointestinal:  Negative for abdominal pain, diarrhea, nausea and vomiting. Genitourinary:  Negative for difficulty urinating. Musculoskeletal:  Negative for back pain and neck pain. Skin:  Negative for rash. Neurological:  Negative for weakness and headaches. Psychiatric/Behavioral:  Negative for confusion. A complete review of systems was performed and is negative except as noted above in the HPI.        PAST MEDICAL HISTORY     Past Medical History:   Diagnosis Date    Anxiety     Bipolar disorder ([facility] [address]     HTN (hypertension)     Hyperlipidemia          SURGICAL HISTORY       Past Surgical History:   Procedure Laterality Date    KNEE SURGERY      LAP BAND  placed 2009 with removal in [address]       Previous Medications    DIVALPROEX (DEPAKOTE ER) 500 MG EXTENDED RELEASE TABLET    Take 2 tablets by mouth daily    DOXYCYCLINE HYCLATE (VIBRAMYCIN) 100 MG CAPSULE    Take 1 capsule by mouth every 12 hours for 17 doses    RISPERIDONE (RISPERDAL) 1 MG TABLET    Take 1 tablet by mouth nightly    TRAZODONE (DESYREL) 50 MG TABLET    Take 1 tablet by mouth nightly as needed for Sleep       ALLERGIES     Oxycodone-acetaminophen and Percocet [oxycodone-acetaminophen]    FAMILY HISTORY     History reviewed. No pertinent family history.        SOCIAL HISTORY       Social History     Socioeconomic History    Marital status:      Spouse name: None    Number of children: None    Years of education: None    Highest education level: None   Tobacco Use    Smoking status: Some Days     Types: Cigars    Smokeless tobacco: Never    Tobacco comments:     patient does use a vape   Vaping Use    Vaping Use: Every day   Substance and Sexual Activity    Alcohol use: No     Alcohol/week: 0.0 standard drinks    Drug use: No     Social Determinants of Health     Financial Resource Strain: High Risk    Difficulty of Paying Living Expenses: Very hard   Food Insecurity: Food Insecurity Present    Worried About Running Out of Food in the Last Year: Often true    Ran Out of Food in the Last Year: Often true   Transportation Needs: Unmet Transportation Needs    Lack of Transportation (Medical): Yes    Lack of Transportation (Non-Medical): Yes   Physical Activity: Sufficiently Active    Days of Exercise per Week: 5 days    Minutes of Exercise per Session: 60 min   Stress: Stress Concern Present    Feeling of Stress : Very much   Social Connections: Socially Isolated    Frequency of Communication with Friends and Family: Never    Frequency of Social Gatherings with Friends and Family: Never    Attends [employer] Services: Never Active Member of Clubs or Organizations: No    Attends Club or Organization Meetings: Never    Marital Status:    Intimate Partner Violence: Not At Risk    Fear of Current or Ex-Partner: No    Emotionally Abused: No    Physically Abused: No    Sexually Abused: No   Housing Stability: High Risk    Unable to Pay for Housing in the Last Year: Yes    Number of [place] in the Last Year: 2    Unstable Housing in the Last Year: Yes       SCREENINGS    [place] Coma Scale  Eye Opening: Spontaneous  Best Verbal Response: Oriented  Best Motor Response: Obeys commands  [place] Coma Scale Score: 15        PHYSICAL EXAM    (up to 7 for level 4, 8 or more for level 5)     ED Triage Vitals [12/31/22 1705]   BP Temp Temp src Heart Rate Resp SpO2 Height Weight   (!) 138/100 98.7 °F (37.1 °C) -- 82 20 98 % 5' 10\" (1.778 m) 165 lb (74.8 kg)       Physical Exam  Vitals and nursing note reviewed. Constitutional:       General: He is not in acute distress. Appearance: He is well-developed. He is not diaphoretic. HENT:      Head: Normocephalic and atraumatic. Eyes:      Pupils: Pupils are equal, round, and reactive to light. Cardiovascular:      Rate and Rhythm: Normal rate and regular rhythm. Heart sounds: Normal heart sounds. Pulmonary:      Effort: Pulmonary effort is normal. No respiratory distress. Breath sounds: Normal breath sounds. Abdominal:      General: Bowel sounds are normal. There is no distension. Palpations: Abdomen is soft. Tenderness: There is no abdominal tenderness. Musculoskeletal:         General: Normal range of motion. Cervical back: Normal range of motion and neck supple. Skin:     General: Skin is warm and dry. Findings: No rash. Neurological:      Mental Status: He is alert and oriented to person, place, and time. Cranial Nerves: No cranial nerve deficit. Motor: No abnormal muscle tone.       Coordination: Coordination normal. Psychiatric:         Mood and Affect: Affect is angry. Speech: Speech is rapid and pressured. Behavior: Behavior is agitated and aggressive. Thought Content: Thought content does not include homicidal or suicidal ideation. DIAGNOSTIC RESULTS     EKG: All EKG's are interpreted by the Emergency Department Physician who either signs or Co-signs this chart in the absence of a cardiologist.        RADIOLOGY:   Non-plain film images such as CT, Ultrasound and MRI are read by the radiologist. Plainradiographic images are visualized and preliminarily interpreted by the emergency physician with the below findings:      Interpretation per the Radiologist below, if available at the time of this note:    No orders to display         ED BEDSIDE ULTRASOUND:   Performed by ED Physician - none    LABS:  Labs Reviewed - No data to display    All other labs were within normal range or not returned as of this dictation. EMERGENCY DEPARTMENT COURSE and DIFFERENTIALDIAGNOSIS/MDM:   Vitals:    Vitals:    12/31/22 1705   BP: (!) 138/100   Pulse: 82   Resp: 20   Temp: 98.7 °F (37.1 °C)   SpO2: 98%   Weight: 165 lb (74.8 kg)   Height: 5' 10\" (1.778 m)       MDM    Pt has calmed down. We are unable to fill his meds. He expresses understanding. He denies any SI or HI at this time. He wants to go. CONSULTS:  None    PROCEDURES:  Unless otherwise notedbelow, none     Procedures    FINAL IMPRESSION     1.  Encounter for medication refill          DISPOSITION/PLAN   DISPOSITION Decision To Discharge 12/31/2022 06:02:21 PM      PATIENT REFERRED TO:  @FUP@    DISCHARGE MEDICATIONS:  New Prescriptions    No medications on file          (Please note that portions of this note were completed with a voice recognition program.  Efforts were made to edit the dictations butoccasionally words are mis-transcribed.)    Justo Bragg MD (electronically signed)  AttendingEmergency Physician         Justo Bragg, MD  12/31/22 1500

## 2022-12-31 NOTE — DISCHARGE INSTR - DIET

## 2022-12-31 NOTE — PROGRESS NOTES
Group Note    Date: 12/30/22  Start Time: 7:00 PM   End Time:7:30 PM     Number of Participants: 4    Type of Group: Wrap-Up     Patient's Goal:      Notes:      Status After Intervention:  Unchanged    Participation Level: Minimal    Participation Quality: Appropriate    Speech:  normal    Thought Process/Content: Linear    Mood: anxious and depressed    Level of consciousness:  Alert and Oriented x4    Response to Learning: Able to verbalize current knowledge/experience    Modes of Intervention: Education and Support    Discipline Responsible: Registered Nurse     Signature:  Estevan Duenas

## 2022-12-31 NOTE — PLAN OF CARE
Problem: Self Harm/Suicidality  Goal: Will have no self-injury during hospital stay  Description: INTERVENTIONS:  1. Ensure constant observer at bedside with Q15M safety checks  2. Maintain a safe environment  3. Secure patient belongings  4. Ensure family/visitors adhere to safety recommendations  5. Ensure safety tray has been added to patient's diet order  6. Every shift and PRN: Re-assess suicidal risk via Frequent Screener    Outcome: Completed     Problem: Chronic Conditions and Co-morbidities  Goal: Patient's chronic conditions and co-morbidity symptoms are monitored and maintained or improved  Outcome: Completed     Problem: Pain  Goal: Verbalizes/displays adequate comfort level or baseline comfort level  Outcome: Completed     Problem: Depression  Goal: Will be euthymic at discharge  Description: INTERVENTIONS:  1. Administer medication as ordered  2. Provide emotional support via 1:1 interaction with staff  3. Encourage involvement in milieu/groups/activities  4. Monitor for social isolation  Outcome: Completed     Problem: Maame  Goal: Will exhibit normal sleep and speech and no impulsivity  Description: INTERVENTIONS:  1. Administer medication as ordered  2. Set limits on impulsive behavior  3. Make attempts to decrease external stimuli as possible  Outcome: Completed     Problem: Anxiety  Goal: Will report anxiety at manageable levels  Description: INTERVENTIONS:  1. Administer medication as ordered  2. Teach and rehearse alternative coping skills  3. Provide emotional support with 1:1 interaction with staff  Outcome: Completed     Problem: Sleep Disturbance  Goal: Will exhibit normal sleeping pattern  Description: INTERVENTIONS:  1. Administer medication as ordered  2. Decrease environmental stimuli, including noise, as appropriate  3.  Discourage social isolation and naps during the day  Outcome: Completed     Problem: Nutrition Deficit:  Goal: Optimize nutritional status  Outcome: Completed

## 2022-12-31 NOTE — PROGRESS NOTES
[facility]  Nursing Progress Note    Room: [facility]-   Name: Dawit Jessica   Age: 49 y.o. Gender: male   Dx: Depression with suicidal ideation  Precautions: suicide risk  Inpatient Status: voluntary       SLEEP:  Sleep Quality Good  Sleep Medications:  risperdal 1.0mg   PRN Sleep Meds: Yes trazodone 50 mg      MEDICAL:  Other PRN Meds: No   Med Compliant: Yes  Accu-Chek: No  Oxygen/CPAP/BiPAP: No  CIWA/CINA: No   PAIN Assessment: none  Side Effects from medication: No      Metabolic Screening:  Lab Results   Component Value Date    LABA1C 5.9 02/17/2020     Lab Results   Component Value Date    CHOL 105 (L) 11/04/2019    CHOL 121 (L) 04/18/2019    CHOL 150 (L) 04/16/2018    CHOL 124 (L) 10/16/2017    CHOL 136 (L) 01/09/2017    CHOL 119 (L) 05/16/2016     Lab Results   Component Value Date    TRIG 40 11/04/2019    TRIG 59 04/18/2019    TRIG 153 (H) 04/16/2018    TRIG 129 10/16/2017    TRIG 85 (L) 01/09/2017    TRIG 103 (L) 05/16/2016     Lab Results   Component Value Date    HDL 41 (L) 11/04/2019    HDL 40 (L) 04/18/2019    HDL 36 (L) 04/16/2018    HDL 33 (L) 10/16/2017    HDL 33 (L) 01/09/2017    HDL 28 (L) 05/16/2016     No components found for: LDLCAL  No results found for: LABVLDL  Body mass index is 23.47 kg/m². BP Readings from Last 2 Encounters:   12/30/22 116/75   08/12/21 (!) 141/102         Medical Bed:   Is patient in a medical bed? no   If medical bed is in use, has nursing secured room while patient is awake and out of the room? NA  Has safety checks by nursing been completed on the bed/room this shift? yes    Protective Factors:  Patient identifies protective factors with nursing staff as follows:    Identifies reasons for living: Yes   Supportive Social Network or family: Yes    Belief that suicide is immoral/high spirituality: No   Responsibility to family or others/living with family: Yes   Fear of death or dying due to pain and suffering: Yes   Engaged in work or school: No     If Patient is unable to identify, reason why? N/A      PSYCH:  Depression: 8   Anxiety: 8   SI denies suicidal ideation   Risk of Suicide: No Risk  HI Negative for homicidal ideation      AVH:no If Hallucinations are present, describe? N/A        GENERAL:  Appetite: good   Percent Meals: 50%   Social: No   Speech: normal   Appearance: appropriately dressed, good hygiene, and healthy looking    GROUP:  Group Participation: Yes  Participation Quality: Active Listener    Notes: Pt is in his room sleeping just prior to this assessment. He is cooperative ,his speech is slurred and rapid. Pt reports depression and anxiety as 8 ,he has been in bed during this shift the patient reported he would be getting his SSDI check this week and he would be fine afterwards. Will continue to monitor.
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
## 2022-12-31 NOTE — PROGRESS NOTES
Discharge Note     Patient is discharging on this date. Patient denies SI, HI, and AVH at this time. Patient reports improvement in behavior and is leaving unit in overall good condition. SW and patient discussed patient's follow up appointments and importance of attending appointments as scheduled, patient voiced understanding and agreement. Patient and SW also discussed patient's safety plan and patient was able to verbally identify: warning signs, coping strategies, places and people that help make the patient feel better/distract negative thoughts, friends/family/agencies/professionals the patient can reach out to in a crisis, and something that is important to the patient/worth living for. Patient was provided the national suicide prevention hotline number (7-819-792-897.955.2227) as well as local community behavioral health [facility]) crisis number for emergencies (1-680.161.7051). Discharge Disposition:  Pt requested to leave AMA and to be dropped off at [employer], a [employer] store in [facility]. Pt explained his plan was to take all of his big bags he had with him here at the hospital, and sell the stuff to [employer] to get money. Staff tried to explain to him that planning on money that was guaranteed at all was a bad idea. But pt was very manic and insisted that he only be taken and dropped off there. Referral to outpatient tobacco cessation counseling treatment:  Patient refused referral to outpatient tobacco cessation counseling    SW offered to assist patient with transportation, patient accepted transportation assistance - [employer] transportation was provided by [facility] and pt was taken to [employer].

## 2022-12-31 NOTE — DISCHARGE INSTRUCTIONS
Medications:   Medication Summary Provided. I understand that I should take only the medications on my list.   --why and when I need to take each medication. --which side effects to watch for.   --that I should carry my medication information at all times in case of emergency    Situations. --I will take all medications until discontinued by physician. I will take all my medications to follow up appointments. --check with my physician or pharmacist before taking any new medication, over    the counter product or drink alcohol.   --ask about food, drug and dietary supplement interactions. --discard old lists and update records with medication providers. Behavior Health Follow Up:  Original Referral Source: ED  Discharge Diagnosis: [unfilled]  Recomendations for Level of Care: Follow Up  Patient Status at Discharge: Stable  [facility]  was: [facility]  Aftercare plan faxed:    Faxed by: Social Work staff   Date: 22   Time:   Prescriptions sent with pt. Scripts are sent with patient .      General Information:   Questions regarding your bill: Call [phone]   Suicide Hotline (Rescue Crisis) 7-441.888.4878   To obtain results of pending studies call Medical Records at: [phone]   For emergencies and 24 hour/7 days a week contact information: [phone]

## 2023-01-01 NOTE — PROGRESS NOTES
SW attempted to contact pt to follow-up with him after he discharged from the unit yesterday to see if he had any questions or concerns that needed to be addressed. BOB called the contact number listed for the pt and it said, \"the wireless customer you are calling is not available. Please try your call again later. \"

## 2023-01-01 NOTE — ED NOTES
As patient was exiting the building, he passed in front of the [facility] desk and was talking to himself saying  \"it is a good thing I only have PTSD and not bullets. \" Darline Rivero advised me of patient's statement. I contacted [facility], hospital security, and police dispatch. I also advised Dr. Stanley Morel of this statement.        Marychuy Thorpe RN  12/31/22 [id number]

## 2023-01-01 NOTE — PROGRESS NOTES
Behavioral Health   Discharge Note  Bridge Appointment completed: Reviewed Discharge Instructions with patient. Patient verbalizes understanding and agreement with the discharge plan using the teachback method. Referral for Outpatient Tobacco Cessation Counseling, upon discharge (joselin X if applicable and completed):    ( )  Hospital staff assisted patient to call Quit Line or faxed referral                                   during hospitalization                  ( )  Recognizing danger situations (included triggers and roadblocks), if not completed on admission                    ( )  Coping skills (new ways to manage stress, exercise, relaxation techniques, changing routine, distraction), if not completed on admission                                                           ( )  Basic information about quitting (benefits of quitting, techniques in how to quit, available resources, if not completed on admission  ( ) Referral for counseling faxed to [facility]   ( ) Patient refused referral  ( ) Patient refused counseling  ( x) Patient refused smoking cessation medication upon discharge    Vaccinations (joselin X if applicable and completed):  ( ) Patient states already received influenza vaccine elsewhere  ( ) Patient received influenza vaccine during this hospitalization  (x ) Patient refused influenza vaccine at this time      Pt discharged with followings belongings:   Dental Appliances: None  Vision - Corrective Lenses: None  Hearing Aid: None  Jewelry: None  Body Piercings Removed: N/A  Clothing: Footwear, Socks, Pants, Dress, Jacket/Coat  Other Valuables: Wallet, Cigarettes, Lighter/Matches, Personal Toiletries   Valuables sent home with valuables and home medications. Valuables retrieved from safe and returned to patient. yes Patient left department with staff via ambulatory discharged to self care. Patient education on aftercare instructions: yes  Patient verbalize understanding of AVS: yes.  Suicidal Ideations? No Risk of Suicide: No Risk AVH? denies HI? Negative for homicidal ideation       Status EXAM upon discharge:  Mental Status and Behavioral Exam  Normal: No  Level of Assistance: Independent/Self  Facial Expression: Brightened  Affect: Congruent  Level of Consciousness: Alert  Frequency of Checks: 4 times per hour, close  Mood:Normal: No  Mood: Depressed, Anxious (rates depression and anxiety 8)  Motor Activity:Normal: No  Motor Activity: Decreased  Eye Contact: Poor  Observed Behavior: Cooperative  Sexual Misconduct History: Current - no  Preception: [place] to person, [place] to situation, [place] to place, [place] to time  Attention:Normal: No  Attention: Distractible  Thought Processes: Other (comment)  Thought Content:Normal: Yes  Thought Content: Preoccupations  Depression Symptoms: Change in energy level, Loss of interest  Anxiety Symptoms: Generalized  Maame Symptoms: Poor judgment  Hallucinations: None  Delusions: No  Delusions: Influence, Controlled  Memory:Normal: Yes  Memory: Poor recent  Insight and Judgment: No  Insight and Judgment: Poor judgment, Poor insight    AVS/Transition Record has been discussed with patient and a copy was given to the patient. The AVS/Transition Record was faxed to the next level of care today. Patient had all discharge instructions given. Prescriptions are given to patient. All valuables and home medications are given in full. Patient reports he is going to [place] to sell his clothes for money in order to buy his prescriptions. Left by cab. Dr. Aiyana Galeano aware of his discharge plans. Valuables received from hospital security.

## 2023-01-01 NOTE — ED NOTES
Pt states that he does not want to stay at facility tonight in order to follow up on medications.   Pt continues to nunu SI/ELVIRA Reese RN  12/31/22 [id number]

## 2023-01-01 NOTE — PROGRESS NOTES
I Adult Unit Daily Assessment  Nursing Progress Note    Room: [facility]-01   Name: Demetris Woods   Age: 49 y.o. Gender: male   Dx: Depression with suicidal ideation  Precautions: suicide risk  Inpatient Status: voluntary       SLEEP:  Sleep Quality Very good  Sleep Medications:    PRN Sleep Meds:        MEDICAL:  Other PRN Meds:    Med Compliant: Yes  Accu-Chek: No  Oxygen/CPAP/BiPAP: No  CIWA/CINA: No   PAIN Assessment: none  Side Effects from medication: No      Metabolic Screening:  Lab Results   Component Value Date    LABA1C 5.9 02/17/2020     Lab Results   Component Value Date    CHOL 105 (L) 11/04/2019    CHOL 121 (L) 04/18/2019    CHOL 150 (L) 04/16/2018    CHOL 124 (L) 10/16/2017    CHOL 136 (L) 01/09/2017    CHOL 119 (L) 05/16/2016     Lab Results   Component Value Date    TRIG 40 11/04/2019    TRIG 59 04/18/2019    TRIG 153 (H) 04/16/2018    TRIG 129 10/16/2017    TRIG 85 (L) 01/09/2017    TRIG 103 (L) 05/16/2016     Lab Results   Component Value Date    HDL 41 (L) 11/04/2019    HDL 40 (L) 04/18/2019    HDL 36 (L) 04/16/2018    HDL 33 (L) 10/16/2017    HDL 33 (L) 01/09/2017    HDL 28 (L) 05/16/2016     No components found for: LDLCAL  No results found for: LABVLDL  Body mass index is 23.47 kg/m². BP Readings from Last 2 Encounters:   12/31/22 (!) 138/100   12/30/22 116/75         Medical Bed:   Is patient in a medical bed? no   If medical bed is in use, has nursing secured room while patient is awake and out of the room? NA  Has safety checks by nursing been completed on the bed/room this shift? NA    Protective Factors:  Patient identifies protective factors with nursing staff as follows:    Identifies reasons for living: Yes   Supportive Social Network or family: Yes    Belief that suicide is immoral/high spirituality: No   Responsibility to family or others/living with family: Yes   Fear of death or dying due to pain and suffering: Yes   Engaged in work or school: No     If Patient is unable to identify, reason why? PSYCH:  Depression:    Anxiety:    SI denies suicidal ideation   Risk of Suicide: No Risk  HI Negative for homicidal ideation      AVH:no If Hallucinations are present, describe? GENERAL:  Appetite: good   Percent Meals: 75%   Social: Yes   Speech: normal   Appearance: appropriately dressed and disheveled    GROUP:  Group Participation: No  Participation Quality: None    Notes: Patient is at the desk talking to nursing staff. Patient is intrusive and won't leave the desk. Patient met with his provider and discharged AMA. All home medication are returned in full. All valuables are returned in full. Patient is planning on going to [place] [id number]. to sell  clothes for money in order for him to go and buy his medications. Dr. Kartik Aggarwal is aware of patient's discharge plans.          Electronically signed by Ed Lee RN on 12/31/22 at 6:17 PM CST

## 2023-01-02 NOTE — PROGRESS NOTES
Discharge Note     Patient is discharging on this date. Patient denies SI, HI, and AVH at this time. Patient reports improvement in behavior and is leaving unit in overall good condition. SW and patient discussed patient's follow up appointments and importance of attending appointments as scheduled, patient voiced understanding and agreement. Patient and SW also discussed patient's safety plan and patient was able to verbally identify: warning signs, coping strategies, places and people that help make the patient feel better/distract negative thoughts, friends/family/agencies/professionals the patient can reach out to in a crisis, and something that is important to the patient/worth living for. Patient was provided the national suicide prevention hotline number (2-538-737-284-090-1754) as well as local community behavioral health [facility]) crisis number for emergencies ([phone]). Discharge Disposition: home -lives with family      Pt to follow up with:  [id number]  on January 6 , 2022 at 1:00 PM for the intake appointment. Patient will follow up with [id number]    On January 9 , 2022   at 1:00 PM for the medication management appointment.      Referral to outpatient tobacco cessation counseling treatment:  Patient refused referral to outpatient tobacco cessation counseling    SW offered to assist patient with transportation, patient declined transportation assistance